# Patient Record
Sex: FEMALE | Employment: STUDENT | URBAN - METROPOLITAN AREA
[De-identification: names, ages, dates, MRNs, and addresses within clinical notes are randomized per-mention and may not be internally consistent; named-entity substitution may affect disease eponyms.]

---

## 2019-02-06 ENCOUNTER — MEDICAL CORRESPONDENCE (OUTPATIENT)
Dept: HEALTH INFORMATION MANAGEMENT | Facility: CLINIC | Age: 39
End: 2019-02-06

## 2019-02-06 ENCOUNTER — TELEPHONE (OUTPATIENT)
Dept: SURGERY | Facility: CLINIC | Age: 39
End: 2019-02-06

## 2019-02-06 ENCOUNTER — TRANSFERRED RECORDS (OUTPATIENT)
Dept: HEALTH INFORMATION MANAGEMENT | Facility: CLINIC | Age: 39
End: 2019-02-06

## 2019-02-06 NOTE — TELEPHONE ENCOUNTER
[facility] Call [facility]    Phone Message    May a detailed message be left on voicemail: yes    Reason for Call: Other: Sharon Rossing referring - anal bleeding. No records have come via fax. Pt scheduled next available.     Action Taken: Message routed to:  [facility] Rect[facility]

## 2019-02-11 NOTE — TELEPHONE ENCOUNTER
Patient was called to assess her symptoms in regard to the below message. The direct phone number was left in a voicemail with a request for the patient to call back at her earliest convenience.     ANGELA Suggs Care Coordinator  [facility]  Phone: [phone]

## 2019-02-21 NOTE — TELEPHONE ENCOUNTER
FUTURE VISIT INFORMATION      FUTURE VISIT INFORMATION:    Date: 2/25/19    Time: 10:30AM    Location: [facility]  REFERRAL INFORMATION:    Referring provider:  Sharon Cherry CNP    Referring providers clinic:  [facility]    Reason for visit/diagnosis:  Anal bleeding    NOTES STATUS DETAILS   OFFICE NOTE from referring provider  Received 2/6/19   OFFICE NOTE from other specialist   N/A    DISCHARGE SUMMARY from hospital  N/A    DISCHARGE REPORT from the ER N/A    OPERATIVE REPORT  N/A    MEDICATION LIST Received    LABS     PFC TESTING N/A    ANAL PAP N/A    BIOPSIES/PATHOLOGY RELATED TO DIAGNOSIS N/A    DIAGNOSTIC PROCEDURES     COLONOSCOPY N/A    UPPER ENDOSCOPY (EGD) N/A    FLEX SIGMOIDOSCOPY  N/A    ERCP N/A    IMAGING (DISC & REPORT)      CT  N/A    MRI N/A    XRAY N/A    ULTRASOUND (ENDOANAL/ENDORECTAL) N/A

## 2019-02-24 ASSESSMENT — ENCOUNTER SYMPTOMS
CONSTIPATION: 1
MYALGIAS: 1
NECK PAIN: 1
BLOOD IN STOOL: 1
PALPITATIONS: 1
DECREASED LIBIDO: 1
BLOATING: 1

## 2019-02-25 ENCOUNTER — OFFICE VISIT (OUTPATIENT)
Dept: SURGERY | Facility: CLINIC | Age: 39
End: 2019-02-25
Payer: COMMERCIAL

## 2019-02-25 ENCOUNTER — PRE VISIT (OUTPATIENT)
Dept: SURGERY | Facility: CLINIC | Age: 39
End: 2019-02-25

## 2019-02-25 VITALS
BODY MASS INDEX: 22.45 KG/M2 | TEMPERATURE: 97.7 F | DIASTOLIC BLOOD PRESSURE: 57 MMHG | OXYGEN SATURATION: 92 % | SYSTOLIC BLOOD PRESSURE: 97 MMHG | HEIGHT: 62 IN | RESPIRATION RATE: 16 BRPM | HEART RATE: 65 BPM | WEIGHT: 122 LBS

## 2019-02-25 DIAGNOSIS — K62.5 RECTAL BLEEDING: Primary | ICD-10-CM

## 2019-02-25 RX ORDER — CHOLECALCIFEROL (VITAMIN D3) 1250 MCG
CAPSULE ORAL
COMMUNITY
End: 2020-02-25

## 2019-02-25 RX ORDER — MULTIVIT-MIN/IRON/FOLIC ACID/K 18-600-40
CAPSULE ORAL
COMMUNITY
End: 2020-02-25

## 2019-02-25 SDOH — HEALTH STABILITY: MENTAL HEALTH: HOW OFTEN DO YOU HAVE 6 OR MORE DRINKS ON ONE OCCASION?: MONTHLY

## 2019-02-25 SDOH — HEALTH STABILITY: MENTAL HEALTH: HOW OFTEN DO YOU HAVE A DRINK CONTAINING ALCOHOL?: 2-4 TIMES A MONTH

## 2019-02-25 SDOH — HEALTH STABILITY: MENTAL HEALTH: HOW MANY STANDARD DRINKS CONTAINING ALCOHOL DO YOU HAVE ON A TYPICAL DAY?: 3 OR 4

## 2019-02-25 ASSESSMENT — MIFFLIN-ST. JEOR: SCORE: 1170.51

## 2019-02-25 ASSESSMENT — PAIN SCALES - GENERAL: PAINLEVEL: NO PAIN (0)

## 2019-02-25 NOTE — PROGRESS NOTES
"Colon and Rectal Surgery Consult Clinic Note    Date: 2019     Referring provider:  Sharon Cherry NP  [address] [address]     RE: Noemi Medrano  : 1980  WILLIAM: 2019    Noemi \"Nhi\" Mitchell is a very pleasant 38 year old female without a significant past medical history with a recent diagnosis of rectal bleeding.  Given these findings they were subsequently sent to the [facility] for an opinion on this and a new patient consultation.     Nhi had had bright red blood with bowel movements for about one month. This was initially quite a bit that dripped into the toilet. It has decreased some but is with every bowel movement. No associated pain. No abdominal pain, nausea, vomiting, or unexplained weight loss. No change in bowel habits. Stools are typically soft and every 2-3 days. Occasionally the start of the stool is slightly hard. She is taking a daily fiber supplement.  She reportedly had a right hemicolectomy at the age of 16 or 17. She was told she had an appendicitis but during surgery her colon was noted to be thickened and inflamed. Pathology showed no evidence of cancer or IBD.  She reports having a colonoscopy last about 2 years ago in [facility], and this was negative.    Assessment/Plan: 38 year old female with rectal bleeding. Very small hemorrhoids on exam. These certainly could be the source of her bleeding with some irregular bowel movements but are not convincingly large. Would recommend a flexible sigmoidoscopy to rule out other sources of bleeding as she has had persistent bleeding for over a month. In the meantime increase fiber supplement to 2-3 times a day and can take a laxative in addition if having hard stools. Patient's questions were answered to her stated satisfaction and she is in agreement with this plan.    Medical history:  No past medical history on file.    Surgical history:  No past surgical " "history on file.    Problem list:  There are no active problems to display for this patient.      Medications:  Current Outpatient Medications   Medication Sig Dispense Refill     Ascorbic Acid (VITAMIN C) 500 MG CAPS        cholecalciferol (VITAMIN D3) 71553 units (1250 mcg) capsule          Allergies:  Allergies   Allergen Reactions     Sulfa Drugs Rash       Family history:  No family history on file.    Social history:  Social History     Tobacco Use     Smoking status: Current Every Day Smoker     Packs/day: 0.25     Types: Cigarettes     Start date: 1/1/1998     Smokeless tobacco: Never Used   Substance Use Topics     Alcohol use: Yes     Alcohol/week: 1.2 - 2.4 oz     Types: 1 - 3 Glasses of wine, 1 Shots of liquor per week     Frequency: 2-4 times a month     Drinks per session: 3 or 4     Binge frequency: Monthly    Marital status: .  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      Marguerite Garcia LPN                             Physical Examination:  BP 97/57 (BP Location: Left arm, Patient Position: Sitting, Cuff Size: Adult Regular)   Pulse 65   Temp 97.7  F (36.5  C) (Oral)   Resp 16   Ht 5' 1.61\"   Wt 122 lb   SpO2 92%   BMI 22.59 kg/m    General: alert, oriented, in no acute distress, sitting comfortably  HEENT: mucous membranes moist   Perianal external examination: Exam was chaperoned by Marguerite Garcia LPN  Perianal skin: Intact with no excoriation or lichenification.  Lesions: No evidence of an external lesion, nodularity, or induration in the " perianal region.  Eversion of buttocks: There was not evidence of an anal fissure. Details: N/A.  Skin tags or external hemorrhoids: None.  Digital rectal examination: Was performed.   Sphincter tone: Good.  Palpable lesions: No.  Other: None.  Bimanual examination: was not performed    Anoscopy: Was performed.   Hemorrhoids: Yes. Grade 1 internal hemorrhoids without active bleeding  Lesions: No    Total face to face time was 20 minutes, >50% counseling.    SONAL Cota, NP-C  Colon and Rectal Surgery   [facility]    This note was created using speech recognition software and may contain unintended word substitutions.

## 2019-02-25 NOTE — NURSING NOTE
"Chief Complaint   Patient presents with     Consult     Possible anal bleeding       Vitals:    02/25/19 1030   BP: 97/57   BP Location: Left arm   Patient Position: Sitting   Cuff Size: Adult Regular   Pulse: 65   Resp: 16   Temp: 97.7  F (36.5  C)   TempSrc: Oral   SpO2: 92%   Weight: 122 lb   Height: 5' 1.61\"       Body mass index is 22.59 kg/m .      Marguerite Garcia LPN                          "

## 2019-02-25 NOTE — PATIENT INSTRUCTIONS
1. Start a daily fiber supplement such as Citrucel or Metamucil. Start with once a day and slowly increase up to three times a day, if needed, over the next 4-6 weeks  2. Return to clinic for flexible sigmoidoscopy   pHd

## 2019-02-25 NOTE — LETTER
"2019       RE: Noemi Medrano  [address]  [facility] [address]     Dear Colleague,    Thank you for referring your patient, Noemi Medrano, to the [facility] COLON AND RECTAL SURGERY at [facility]. Please see a copy of my visit note below.    Colon and Rectal Surgery Consult Clinic Note    Date: 2019     Referring provider:  Sharon Cherry NP  [address] [address]     RE: Noemi Medrano  : 1980  WILLIAM: 2019    Noemi \"Nhi\" Mitchell is a very pleasant 38 year old female without a significant past medical history with a recent diagnosis of rectal bleeding.  Given these findings they were subsequently sent to the [facility] Clinic for an opinion on this and a new patient consultation.     Nhi had had bright red blood with bowel movements for about one month. This was initially quite a bit that dripped into the toilet. It has decreased some but is with every bowel movement. No associated pain. No abdominal pain, nausea, vomiting, or unexplained weight loss. No change in bowel habits. Stools are typically soft and every 2-3 days. Occasionally the start of the stool is slightly hard. She is taking a daily fiber supplement.  She reportedly had a right hemicolectomy at the age of 16 or 17. She was told she had an appendicitis but during surgery her colon was noted to be thickened and inflamed. Pathology showed no evidence of cancer or IBD.  She reports having a colonoscopy last about 2 years ago in [facility], and this was negative.    Assessment/Plan: 38 year old female with rectal bleeding. Very small hemorrhoids on exam. These certainly could be the source of her bleeding with some irregular bowel movements but are not convincingly large. Would recommend a flexible sigmoidoscopy to rule out other sources of bleeding as she has had persistent bleeding for over a month. In the meantime increase " "fiber supplement to 2-3 times a day and can take a laxative in addition if having hard stools. Patient's questions were answered to her stated satisfaction and she is in agreement with this plan.    Medical history:  No past medical history on file.    Surgical history:  No past surgical history on file.    Problem list:  There are no active problems to display for this patient.    Medications:  Current Outpatient Medications   Medication Sig Dispense Refill     Ascorbic Acid (VITAMIN C) 500 MG CAPS        cholecalciferol (VITAMIN D3) 99053 units (1250 mcg) capsule          Allergies:  Allergies   Allergen Reactions     Sulfa Drugs Rash       Family history:  No family history on file.    Social history:  Social History     Tobacco Use     Smoking status: Current Every Day Smoker     Packs/day: 0.25     Types: Cigarettes     Start date: 1/1/1998     Smokeless tobacco: Never Used   Substance Use Topics     Alcohol use: Yes     Alcohol/week: 1.2 - 2.4 oz     Types: 1 - 3 Glasses of wine, 1 Shots of liquor per week     Frequency: 2-4 times a month     Drinks per session: 3 or 4     Binge frequency: Monthly    Marital status: .  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    Marguerite Garcia LPN     Physical Examination:  BP 97/57 (BP Location: Left arm, Patient Position: Sitting, Cuff Size: Adult Regular)   Pulse 65   Temp 97.7  F (36.5  C) (Oral)   Resp 16   Ht 5' 1.61\"   Wt 122 lb   SpO2 92%   BMI 22.59 kg/m     General: alert, oriented, in no acute distress, sitting " comfortably  HEENT: mucous membranes moist   Perianal external examination: Exam was chaperoned by Marguerite Garcia LPN  Perianal skin: Intact with no excoriation or lichenification.  Lesions: No evidence of an external lesion, nodularity, or induration in the perianal region.  Eversion of buttocks: There was not evidence of an anal fissure. Details: N/A.  Skin tags or external hemorrhoids: None.  Digital rectal examination: Was performed.   Sphincter tone: Good.  Palpable lesions: No.  Other: None.  Bimanual examination: was not performed    Anoscopy: Was performed.   Hemorrhoids: Yes. Grade 1 internal hemorrhoids without active bleeding  Lesions: No    Total face to face time was 20 minutes, >50% counseling.    SONAL Cota, NP-C  Colon and Rectal Surgery   [facility]    This note was created using speech recognition software and may contain unintended word substitutions.

## 2019-03-09 ENCOUNTER — HEALTH MAINTENANCE LETTER (OUTPATIENT)
Age: 39
End: 2019-03-09

## 2019-04-09 NOTE — PROGRESS NOTES
Colon and Rectal Surgery Consult Clinic Note    Referring provider:  SONAL Chau CNP  [address]  [place] [address]       RE: Noemi Medrano  : 1980  WILLIAM: 4/10/2019      Noemi Medrano is a very pleasant 38 year old female who saw Anabelle Berumen NP in clinic for rectal bleeding and a flexible sigmoidoscopy was recommended.    History of Present Ilness:     Taylor had had bright red blood with bowel movements for about one month. This was initially quite a bit that dripped into the toilet. It has decreased some but is with every bowel movement. No associated pain. No abdominal pain, nausea, vomiting, or unexplained weight loss. No change in bowel habits. Stools are typically soft and every 2-3 days. Occasionally the start of the stool is slightly hard. She is taking a daily fiber supplement.    She reportedly had a right hemicolectomy at the age of 16 or 17. She was told she had an appendicitis but during surgery her colon was noted to be thickened and inflamed. Pathology showed no evidence of cancer or IBD.    She reports having a colonoscopy last about 2 years ago in [facility], and this was negative.    Exam by Anabelle Berumen NP with some small internal hemorrhoids but no obvious source of rectal bleeding and increased fiber and flexible sigmoidoscopy were recommended.    Interval History:  Taylor has been doing well.  She has not had any bleeding for the past 15 days.  She was taking a daily fiber supplement but was having increasing abdominal discomfort with this.  She has had long-standing left lower quadrant abdominal pain that occurs intermittently.  This seems to be worse with a fiber supplement but has not completely subsided after stopping it.  Bowel movements are fairly soft but she does occasionally have a hard stool.  She denies any anal pain or tissue prolapse.    PLEASE SEE NOTE BELOW FOR PHYSICAL EXAMINATION, REVIEW OF SYSTEMS,  AND OTHER HISTORY.    Assessment/Plan: 38 year old female with rectal bleeding and intermittent left lower quadrant abdominal pain. No abnormalities on flexible sigmoidoscopy today but this was incomplete due to discomfort. No bleeding in the past few weeks. Recommended a daily fiber supplement to keep stools regular. Return to clinic for any return of rectal bleeding. Patient's questions were answered to her stated satisfaction and she is in agreement with this plan.    PLEASE SEE NOTE BELOW FOR PHYSICAL EXAMINATION, REVIEW OF SYSTEMS, AND OTHER HISTORY.    Total face to face time was 10 minutes, outside the procedure time, which was an additional 10 minutes, >50% counseling.    Sharlene Garcia MD  Colon and Rectal Surgery Staff  [facility]      -------------------------------------------------------------------------------------------------------------------      Medical history:  No past medical history on file.    Surgical history:  No past surgical history on file.    Problem list:  There are no active problems to display for this patient.      Medications:  Current Outpatient Medications   Medication Sig Dispense Refill     Ascorbic Acid (VITAMIN C) 500 MG CAPS        cholecalciferol (VITAMIN D3) 92496 units (1250 mcg) capsule        omega 3 1000 MG CAPS          Allergies:  Allergies   Allergen Reactions     Sulfa Drugs Rash       Family history:  No family history on file.    Social history:  Social History     Socioeconomic History     Marital status:      Spouse name: Not on file     Number of children: Not on file     Years of education: Not on file     Highest education level: Not on file   Occupational History     Not on file   Social Needs     Financial resource strain: Not on file     Food insecurity:     Worry: Not on file     Inability: Not on file     Transportation needs:     Medical: Not on file     Non-medical: Not on file   Tobacco Use     Smoking  "status: Current Every Day Smoker     Packs/day: 0.25     Types: Cigarettes     Start date: 1/1/1998     Smokeless tobacco: Never Used   Substance and Sexual Activity     Alcohol use: Yes     Alcohol/week: 1.2 - 2.4 oz     Types: 1 - 3 Glasses of wine, 1 Shots of liquor per week     Frequency: 2-4 times a month     Drinks per session: 3 or 4     Binge frequency: Monthly     Drug use: No     Sexual activity: Not on file   Lifestyle     Physical activity:     Days per week: Not on file     Minutes per session: Not on file     Stress: Not on file   Relationships     Social connections:     Talks on phone: Not on file     Gets together: Not on file     Attends [employer] service: Not on file     Active member of club or organization: Not on file     Attends meetings of clubs or organizations: Not on file     Relationship status: Not on file     Intimate partner violence:     Fear of current or ex partner: Not on file     Emotionally abused: Not on file     Physically abused: Not on file     Forced sexual activity: Not on file   Other Topics Concern     Not on file   Social History Narrative     Not on file         Nursing Notes:   Tylor Funez EMT  4/10/2019  1:01 PM  Signed  Chief Complaint   Patient presents with     Flexible Sigmoidoscopy     Rectal Problem       Vitals:    04/10/19 1253   BP: 103/64   BP Location: Left arm   Patient Position: Sitting   Cuff Size: Adult Regular   Pulse: 78   Weight: 118 lb   Height: 5' 1.61\"       Body mass index is 21.86 kg/m .      NEISHA Millan                         Physical Examination:  /64 (BP Location: Left arm, Patient Position: Sitting, Cuff Size: Adult Regular)   Pulse 78   Ht 1.565 m (5' 1.61\")   Wt 53.5 kg (118 lb)   BMI 21.86 kg/m    General: alert, oriented, in no acute distress, sitting comfortably  HEENT: mucous membranes moist    Digital rectal examination: Was performed.   Sphincter tone: Good.  Palpable lesions: No.  Other: None.  Bimanual " examination: was not performed.    Procedures:  Flexible sigmoidoscopy was performed by Anabelle Berumen NP under my direct supervision.  Two fleets enemas were given. Informed consent was obtained and time out was performed per protocol.  Scope was advanced to approximately 30 cm.  Patient had quite a bit of discomfort with this so was not advanced further.  No mucosal abnormalities.  Retroflexion was not performed due to patient discomfort.

## 2019-04-10 ENCOUNTER — OFFICE VISIT (OUTPATIENT)
Dept: INTERPRETER SERVICES | Facility: CLINIC | Age: 39
End: 2019-04-10
Payer: COMMERCIAL

## 2019-04-10 ENCOUNTER — OFFICE VISIT (OUTPATIENT)
Dept: SURGERY | Facility: CLINIC | Age: 39
End: 2019-04-10
Payer: COMMERCIAL

## 2019-04-10 VITALS
WEIGHT: 118 LBS | HEART RATE: 78 BPM | HEIGHT: 62 IN | BODY MASS INDEX: 21.71 KG/M2 | SYSTOLIC BLOOD PRESSURE: 103 MMHG | DIASTOLIC BLOOD PRESSURE: 64 MMHG

## 2019-04-10 DIAGNOSIS — K62.5 RECTAL BLEEDING: Primary | ICD-10-CM

## 2019-04-10 RX ORDER — OMEGA-3 FATTY ACIDS/FISH OIL 300-1000MG
CAPSULE ORAL
COMMUNITY
End: 2020-02-25

## 2019-04-10 ASSESSMENT — MIFFLIN-ST. JEOR: SCORE: 1152.3

## 2019-04-10 NOTE — LETTER
4/10/2019       RE: Noemi Medrano  [address]  [facility] [address]     Dear Colleague,    Thank you for referring your patient, Noemi Medrano, to the [facility] COLON AND RECTAL SURGERY at [facility]. Please see a copy of my visit note below.    Colon and Rectal Surgery Consult Clinic Note    Referring provider:  SONAL Chau CNP  [address] [place] [address]       RE: Noemi Medrano  : 1980  WILLIAM: 4/10/2019    Noemi Medrano is a very pleasant 38 year old female who saw Anabelle Berumen NP in clinic for rectal bleeding and a flexible sigmoidoscopy was recommended.    History of Present Ilness:     Taylor had had bright red blood with bowel movements for about one month. This was initially quite a bit that dripped into the toilet. It has decreased some but is with every bowel movement. No associated pain. No abdominal pain, nausea, vomiting, or unexplained weight loss. No change in bowel habits. Stools are typically soft and every 2-3 days. Occasionally the start of the stool is slightly hard. She is taking a daily fiber supplement.    She reportedly had a right hemicolectomy at the age of 16 or 17. She was told she had an appendicitis but during surgery her colon was noted to be thickened and inflamed. Pathology showed no evidence of cancer or IBD.    She reports having a colonoscopy last about 2 years ago in [facility], and this was negative.    Exam by Anabelle Berumen NP with some small internal hemorrhoids but no obvious source of rectal bleeding and increased fiber and flexible sigmoidoscopy were recommended.    Interval History:  Taylor has been doing well.  She has not had any bleeding for the past 15 days.  She was taking a daily fiber supplement but was having increasing abdominal discomfort with this.  She has had long-standing left lower quadrant abdominal pain that occurs intermittently.   This seems to be worse with a fiber supplement but has not completely subsided after stopping it.  Bowel movements are fairly soft but she does occasionally have a hard stool.  She denies any anal pain or tissue prolapse.    PLEASE SEE NOTE BELOW FOR PHYSICAL EXAMINATION, REVIEW OF SYSTEMS, AND OTHER HISTORY.    Assessment/Plan: 38 year old female with rectal bleeding and intermittent left lower quadrant abdominal pain. No abnormalities on flexible sigmoidoscopy today but this was incomplete due to discomfort. No bleeding in the past few weeks. Recommended a daily fiber supplement to keep stools regular. Return to clinic for any return of rectal bleeding. Patient's questions were answered to her stated satisfaction and she is in agreement with this plan.    PLEASE SEE NOTE BELOW FOR PHYSICAL EXAMINATION, REVIEW OF SYSTEMS, AND OTHER HISTORY.    Total face to face time was 10 minutes, outside the procedure time, which was an additional 10 minutes, >50% counseling.    Sharlene Garcia MD  Colon and Rectal Surgery Staff  [facility]    -------------------------------------------------------------------------------------------------------------------    Medical history:  No past medical history on file.    Surgical history:  No past surgical history on file.    Problem list:  There are no active problems to display for this patient.    Medications:  Current Outpatient Medications   Medication Sig Dispense Refill     Ascorbic Acid (VITAMIN C) 500 MG CAPS        cholecalciferol (VITAMIN D3) 83015 units (1250 mcg) capsule        omega 3 1000 MG CAPS          Allergies:  Allergies   Allergen Reactions     Sulfa Drugs Rash       Family history:  No family history on file.    Social history:  Social History     Socioeconomic History     Marital status:      Spouse name: Not on file     Number of children: Not on file     Years of education: Not on file     Highest education level: Not  "on file   Occupational History     Not on file   Social Needs     Financial resource strain: Not on file     Food insecurity:     Worry: Not on file     Inability: Not on file     Transportation needs:     Medical: Not on file     Non-medical: Not on file   Tobacco Use     Smoking status: Current Every Day Smoker     Packs/day: 0.25     Types: Cigarettes     Start date: 1/1/1998     Smokeless tobacco: Never Used   Substance and Sexual Activity     Alcohol use: Yes     Alcohol/week: 1.2 - 2.4 oz     Types: 1 - 3 Glasses of wine, 1 Shots of liquor per week     Frequency: 2-4 times a month     Drinks per session: 3 or 4     Binge frequency: Monthly     Drug use: No     Sexual activity: Not on file   Lifestyle     Physical activity:     Days per week: Not on file     Minutes per session: Not on file     Stress: Not on file   Relationships     Social connections:     Talks on phone: Not on file     Gets together: Not on file     Attends Restoration service: Not on file     Active member of club or organization: Not on file     Attends meetings of clubs or organizations: Not on file     Relationship status: Not on file     Intimate partner violence:     Fear of current or ex partner: Not on file     Emotionally abused: Not on file     Physically abused: Not on file     Forced sexual activity: Not on file   Other Topics Concern     Not on file   Social History Narrative     Not on file       Nursing Notes:   Tylor Funez EMT  4/10/2019  1:01 PM  Signed  Chief Complaint   Patient presents with     Flexible Sigmoidoscopy     Rectal Problem       Vitals:    04/10/19 1253   BP: 103/64   BP Location: Left arm   Patient Position: Sitting   Cuff Size: Adult Regular   Pulse: 78   Weight: 118 lb   Height: 5' 1.61\"       Body mass index is 21.86 kg/m .      NEISHA Millan       Physical Examination:  /64 (BP Location: Left arm, Patient Position: Sitting, Cuff Size: Adult Regular)   Pulse 78   Ht 1.565 m (5' 1.61\") "   Wt 53.5 kg (118 lb)   BMI 21.86 kg/m     General: alert, oriented, in no acute distress, sitting comfortably  HEENT: mucous membranes moist    Digital rectal examination: Was performed.   Sphincter tone: Good.  Palpable lesions: No.  Other: None.  Bimanual examination: was not performed.    Procedures:  Flexible sigmoidoscopy was performed by Anabelle Berumen NP under my direct supervision.  Two fleets enemas were given. Informed consent was obtained and time out was performed per protocol.  Scope was advanced to approximately 30 cm.  Patient had quite a bit of discomfort with this so was not advanced further.  No mucosal abnormalities.  Retroflexion was not performed due to patient discomfort.    Again, thank you for allowing me to participate in the care of your patient.      Sincerely,    Sharlene Garcia MD

## 2019-04-10 NOTE — NURSING NOTE
"Chief Complaint   Patient presents with     Flexible Sigmoidoscopy     Rectal Problem       Vitals:    04/10/19 1253   BP: 103/64   BP Location: Left arm   Patient Position: Sitting   Cuff Size: Adult Regular   Pulse: 78   Weight: 118 lb   Height: 5' 1.61\"       Body mass index is 21.86 kg/m .      Tylor Funez, EMT                      "

## 2019-06-26 ENCOUNTER — MEDICAL CORRESPONDENCE (OUTPATIENT)
Dept: HEALTH INFORMATION MANAGEMENT | Facility: CLINIC | Age: 39
End: 2019-06-26

## 2019-06-26 ENCOUNTER — TRANSFERRED RECORDS (OUTPATIENT)
Dept: HEALTH INFORMATION MANAGEMENT | Facility: CLINIC | Age: 39
End: 2019-06-26

## 2019-07-22 NOTE — TELEPHONE ENCOUNTER
FUTURE VISIT INFORMATION      FUTURE VISIT INFORMATION:    Date: 7.24.19    Time: 11:00, 11:15    Location: [facility]  REFERRAL INFORMATION:    Referring provider:  Angi Ocampo    Referring providers clinic:  [facility]    Reason for visit/diagnosis:  DX:  Telangiectasia of Face, scheduled per pt    RECORDS REQUESTED FROM:       Clinic name Comments Records Status Imaging Status   Martha 6.26.19 Angi Ocampo In [facility]

## 2019-07-24 ENCOUNTER — PRE VISIT (OUTPATIENT)
Dept: DERMATOLOGY | Facility: CLINIC | Age: 39
End: 2019-07-24

## 2019-07-24 ENCOUNTER — OFFICE VISIT (OUTPATIENT)
Dept: DERMATOLOGY | Facility: CLINIC | Age: 39
End: 2019-07-24
Payer: COMMERCIAL

## 2019-07-24 DIAGNOSIS — L65.9 ALOPECIA: ICD-10-CM

## 2019-07-24 DIAGNOSIS — L81.1 MELASMA: ICD-10-CM

## 2019-07-24 DIAGNOSIS — L65.9 ALOPECIA: Primary | ICD-10-CM

## 2019-07-24 DIAGNOSIS — L30.9 DERMATITIS: ICD-10-CM

## 2019-07-24 LAB
BASOPHILS # BLD AUTO: 0 10E9/L (ref 0–0.2)
BASOPHILS NFR BLD AUTO: 0.7 %
DIFFERENTIAL METHOD BLD: NORMAL
EOSINOPHIL # BLD AUTO: 0.2 10E9/L (ref 0–0.7)
EOSINOPHIL NFR BLD AUTO: 3.4 %
ERYTHROCYTE [DISTWIDTH] IN BLOOD BY AUTOMATED COUNT: 12.2 % (ref 10–15)
FERRITIN SERPL-MCNC: 99 NG/ML (ref 12–150)
HCT VFR BLD AUTO: 43.6 % (ref 35–47)
HGB BLD-MCNC: 14.6 G/DL (ref 11.7–15.7)
IMM GRANULOCYTES # BLD: 0 10E9/L (ref 0–0.4)
IMM GRANULOCYTES NFR BLD: 0.2 %
LYMPHOCYTES # BLD AUTO: 2.1 10E9/L (ref 0.8–5.3)
LYMPHOCYTES NFR BLD AUTO: 34.8 %
MCH RBC QN AUTO: 31.9 PG (ref 26.5–33)
MCHC RBC AUTO-ENTMCNC: 33.5 G/DL (ref 31.5–36.5)
MCV RBC AUTO: 95 FL (ref 78–100)
MONOCYTES # BLD AUTO: 0.4 10E9/L (ref 0–1.3)
MONOCYTES NFR BLD AUTO: 6.8 %
NEUTROPHILS # BLD AUTO: 3.2 10E9/L (ref 1.6–8.3)
NEUTROPHILS NFR BLD AUTO: 54.1 %
NRBC # BLD AUTO: 0 10*3/UL
NRBC BLD AUTO-RTO: 0 /100
PLATELET # BLD AUTO: 240 10E9/L (ref 150–450)
RBC # BLD AUTO: 4.57 10E12/L (ref 3.8–5.2)
TSH SERPL DL<=0.005 MIU/L-ACNC: 2.59 MU/L (ref 0.4–4)
WBC # BLD AUTO: 5.9 10E9/L (ref 4–11)

## 2019-07-24 RX ORDER — TRETINOIN 0.5 MG/G
CREAM TOPICAL AT BEDTIME
COMMUNITY
End: 2021-03-29

## 2019-07-24 RX ORDER — HYDROCORTISONE 2.5 %
CREAM (GRAM) TOPICAL
Qty: 30 G | Refills: 1 | Status: SHIPPED | OUTPATIENT
Start: 2019-07-24 | End: 2020-02-25

## 2019-07-24 ASSESSMENT — PAIN SCALES - GENERAL: PAINLEVEL: NO PAIN (0)

## 2019-07-24 NOTE — PROGRESS NOTES
[facility] Dermatology Note      Dermatology Problem List:  1. Melasma   -current tx: Tri-Lyla   -previous tx:metrocream, tretinoin 0.05% cream    Encounter Date: Jul 24, 2019    CC:  Chief Complaint   Patient presents with     Derm Problem     Taylor is here today for redness, telangietasia and hyperpigmenation on her face. She also notes possible fungal infection of toenail, and a rash on the hip. Taylor notes she has had recent hair thinning as well.          History of Present Illness:  Ms. Noemi Medrano is a 38 year old female who presents as a referral from [facility] for redness and hyperpigmentation of the face. She has never been seen in our dermatology clinic before. Today she reports that she is using metrocream and tretinoin. Her facial problems started in the winter. The creams have been helping with the acne, but not with the redness. Wears sunscreen everyday. Also reports of patches of hair loss that have been present in the past year. In the last 2 months she has noticed that her hair has been coming out more diffusely. Thinning generally in the back of the scalp. Her pillow is usually full of hair and it comes out in clumps when she is in the shower. Reports of a possible toe fungus on the toenail and a rash on the hip. She noticed the rash about 2-3 days ago. She went camping 3 weeks ago, could be a possible bite.       Past Medical History:   There is no problem list on file for this patient.    No past medical history on file.  No past surgical history on file.    Social History:  Patient is originally from [place].     Family History:  Family history of skin cancer, unknown, father     Medications:  Current Outpatient Medications   Medication Sig Dispense Refill     metroNIDAZOLE (METROCREAM) 0.75 % external cream Apply topically 2 times daily       omega 3 1000 MG CAPS        tretinoin (RETIN-A) 0.05 % external cream Apply topically At Bedtime       Ascorbic Acid  (VITAMIN C) 500 MG CAPS        cholecalciferol (VITAMIN D3) 65834 units (1250 mcg) capsule          Allergies   Allergen Reactions     Sulfa Drugs Rash       Review of Systems:  -Skin: as per HPI.  -Constitutional: The patient is feeling generally well.  -OB: not pregnant or breast feeding.     Physical exam:  Vitals: There were no vitals taken for this visit.  GEN: This is a well developed, well-nourished female in no acute distress, in a pleasant mood.    SKIN: Acne exam, which includes the face, neck, upper central chest, and upper central back was performed. Including toe nail.   - pink 1 mm papules coalescing into a plaque on the left hip  - Hyperpigmented macules coalescing into macules on the foreheads and cheeks   - Telangiectasias on the cheeks   - Hair pull tests negative   - Joe part width is 1  - scalp appears healthy   - eyelashes and eyelashes WNL   - yellow discoloration of the right great toe nail   - No other lesions of concern on areas examined.       Impression/Plan:  1. Melasma, cheeks and forehead      Continue sunscreen daily     Recommend sunscreens containing zinc oxide or titanium dioxide or iron oxide     Hold metrocream and tretinoin     Discussed Tri-Lyla - apply once daily to the entire face for 8 weeks. Ok to skip a day if too irritating, use moisturizer in replacement     Discussed chemical peels in the fall, pt is too tan right now     Photos taken and sunscreen samples given     2. Rosacea, mild     Will hold off on treatment at this time    Recommend visiting an eye doctor     3. Alopecia, mild,non scarring-, pt would like to schedule full hair eval - negative hair pull test, healthy scalp scalp, no evidence of scarring, will initiate work up, could be resolved TE that will take time to recover or unmasking of pattern hair losss. Keep alopecia areata diffuse type in the differential of course    For alopecia, will obtain TSH with reflex T4, CBC with Diff, ferritin, and Vitamin D.      Follow up in 2-4 weeks and consider biopsy at that time    4. Dermatitis - left hip    Start Hydrocortisone 2.5% cream for up to 2 weeks in a row    5. Onychomycosis possibly on toenails    Grow out toe nail and come back for clipping         Follow-up in 2-4 weeks for hair evaluation and nail clipping       Staff Involved:  Scribe/Staff    Scribe Disclosure  I, Lore Rodriguez, am serving as a scribe to document services personally performed by Dr. Anayeli Perze MD, based on data collection and the provider's statements to me.     Provider Disclosure:   The documentation recorded by the scribe accurately reflects the services I personally performed and the decisions made by me.    Anayeli Perez MD    Department of Dermatology  [facility]: Phone: [phone], Fax:[phone]  [facility]: Phone: [phone], Fax: [phone]

## 2019-07-24 NOTE — NURSING NOTE
Chief Complaint   Patient presents with     Derm Problem     Taylor is here today for redness, telangietasia and hyperpigmenation on her face. She also notes possible fungal infection of toenail, and a rash on the hip. Taylor notes she has had recent hair thinning as well.      Lila Kelsey KYLEE    Dermatology Laser Intake Checklist:  History of psoriasis:No  History of recent tan, indoor or outdoor tanning/vacation or other sun exposure: Yes, last week she was camping wears sunblock  History of vitiligo:No  Family history of vitiligo:No  Recent other cosmetic procedure(microderm abrasion/peel/hair removal/facial etc):No  History of HSV:No   Did the patient start valtrex: No  For genital laser hair removal patient only: Is there a history of genital warts or condyloma:NA  Tattoo in the area to be treated:No  Is patient using hydroquinone:No  Retinoids and other acne medications stopped for 2 weeks:No  Has the patient had accutane in the last 6-12 months:No  Pregnant or breastfeeding: No  History of skin cancer in area planned for treatment: No  History of treatment with gold:No  Changes in medical history: No  Photos obtained: Yes  Does the patient smoke:Yes  Is the patient on ibuprofen/aspirin/plavix/coumadin/other blood thinner: No  If patient is taking narcotic or diazepam(valium)-does patient have : No  There were no vitals taken for this visit.

## 2019-07-24 NOTE — LETTER
7/24/2019       RE: Noemi Medrano  [address]  [facility] [address]     Dear Colleague,    Thank you for referring your patient, Noemi Medrano, to the [facility] DERMATOLOGY at [facility]. Please see a copy of my visit note below.    [facility] Dermatology Note      Dermatology Problem List:  1. Melasma   -current tx: Tri-Lyla   -previous tx:metrocream, tretinoin 0.05% cream    Encounter Date: Jul 24, 2019    CC:  Chief Complaint   Patient presents with     Derm Problem     Taylor is here today for redness, telangietasia and hyperpigmenation on her face. She also notes possible fungal infection of toenail, and a rash on the hip. Taylor notes she has had recent hair thinning as well.          History of Present Illness:  Ms. Noemi Medrano is a 38 year old female who presents as a referral from [facility] for redness and hyperpigmentation of the face. She has never been seen in our dermatology clinic before. Today she reports that she is using metrocream and tretinoin. Her facial problems started in the winter. The creams have been helping with the acne, but not with the redness. Wears sunscreen everyday. Also reports of patches of hair loss that have been present in the past year. In the last 2 months she has noticed that her hair has been coming out more diffusely. Thinning generally in the back of the scalp. Her pillow is usually full of hair and it comes out in clumps when she is in the shower. Reports of a possible toe fungus on the toenail and a rash on the hip. She noticed the rash about 2-3 days ago. She went camping 3 weeks ago, could be a possible bite.       Past Medical History:   There is no problem list on file for this patient.    No past medical history on file.  No past surgical history on file.    Social History:  Patient is originally from [place].     Family History:  Family history of skin cancer, unknown, father      Medications:  Current Outpatient Medications   Medication Sig Dispense Refill     metroNIDAZOLE (METROCREAM) 0.75 % external cream Apply topically 2 times daily       omega 3 1000 MG CAPS        tretinoin (RETIN-A) 0.05 % external cream Apply topically At Bedtime       Ascorbic Acid (VITAMIN C) 500 MG CAPS        cholecalciferol (VITAMIN D3) 58912 units (1250 mcg) capsule          Allergies   Allergen Reactions     Sulfa Drugs Rash       Review of Systems:  -Skin: as per HPI.  -Constitutional: The patient is feeling generally well.  -OB: not pregnant or breast feeding.     Physical exam:  Vitals: There were no vitals taken for this visit.  GEN: This is a well developed, well-nourished female in no acute distress, in a pleasant mood.    SKIN: Acne exam, which includes the face, neck, upper central chest, and upper central back was performed. Including toe nail.   - pink 1 mm papules coalescing into a plaque on the left hip  - Hyperpigmented macules coalescing into macules on the foreheads and cheeks   - Telangiectasias on the cheeks   - Hair pull tests negative   - Joe part width is 1  - scalp appears healthy   - eyelashes and eyelashes WNL   - yellow discoloration of the right great toe nail   - No other lesions of concern on areas examined.       Impression/Plan:  1. Melasma, cheeks and forehead      Continue sunscreen daily     Recommend sunscreens containing zinc oxide or titanium dioxide or iron oxide     Hold metrocream and tretinoin     Discussed Tri-Lyla - apply once daily to the entire face for 8 weeks. Ok to skip a day if too irritating, use moisturizer in replacement     Discussed chemical peels in the fall, pt is too tan right now     Photos taken and sunscreen samples given     2. Rosacea, mild     Will hold off on treatment at this time    Recommend visiting an eye doctor     3. Alopecia, mild,non scarring-, pt would like to schedule full hair eval - negative hair pull test, healthy scalp scalp,  no evidence of scarring, will initiate work up, could be resolved TE that will take time to recover or unmasking of pattern hair losss. Keep alopecia areata diffuse type in the differential of course    For alopecia, will obtain TSH with reflex T4, CBC with Diff, ferritin, and Vitamin D.     Follow up in 2-4 weeks and consider biopsy at that time    4. Dermatitis - left hip    Start Hydrocortisone 2.5% cream for up to 2 weeks in a row    5. Onychomycosis possibly on toenails    Grow out toe nail and come back for clipping     Follow-up in 2-4 weeks for hair evaluation and nail clipping       Staff Involved:  Scribe/Staff    Scribe Disclosure  I, Lore Rodriguez, am serving as a scribe to document services personally performed by Dr. Anayeli Perez MD, based on data collection and the provider's statements to me.     Provider Disclosure:   The documentation recorded by the scribe accurately reflects the services I personally performed and the decisions made by me.    Anayeli Perez MD    Department of Dermatology  [facility] [place]: Phone: [phone], Fax:[phone]  [facility]: Phone: [phone], Fax: [phone]

## 2019-07-25 LAB — DEPRECATED CALCIDIOL+CALCIFEROL SERPL-MC: 27 UG/L (ref 20–75)

## 2019-07-31 ENCOUNTER — TELEPHONE (OUTPATIENT)
Dept: DERMATOLOGY | Facility: CLINIC | Age: 39
End: 2019-07-31

## 2019-07-31 NOTE — TELEPHONE ENCOUNTER
Notes recorded by Darlene Mcbride LPN on 7/31/2019 at 8:27 AM CDT  Spoke to pt regarding vitamin D results. Pt informed of recommendations, she will follow up with PCP and has no further questions at this time.  Darlene Mcbride LPN    ------    Notes recorded by Anayeli Perez MD on 7/30/2019 at 5:41 PM CDT  Vitamin D is good, should review with PCP as she is on 5000 daily to see how long she should stay on that dose

## 2019-07-31 NOTE — TELEPHONE ENCOUNTER
----- Message from Anayeli Perez MD sent at 7/30/2019  5:41 PM CDT -----  Vitamin D is good, should review with PCP as she is on 5000 daily to see how long she should stay on that dose

## 2019-08-16 DIAGNOSIS — L81.1 MELASMA: ICD-10-CM

## 2020-02-08 ENCOUNTER — NURSE TRIAGE (OUTPATIENT)
Dept: NURSING | Facility: CLINIC | Age: 40
End: 2020-02-08

## 2020-02-08 ENCOUNTER — APPOINTMENT (OUTPATIENT)
Dept: ULTRASOUND IMAGING | Facility: CLINIC | Age: 40
End: 2020-02-08
Attending: FAMILY MEDICINE
Payer: COMMERCIAL

## 2020-02-08 ENCOUNTER — HOSPITAL ENCOUNTER (EMERGENCY)
Facility: CLINIC | Age: 40
Discharge: HOME | End: 2020-02-08
Attending: FAMILY MEDICINE | Admitting: FAMILY MEDICINE
Payer: COMMERCIAL

## 2020-02-08 VITALS
SYSTOLIC BLOOD PRESSURE: 98 MMHG | OXYGEN SATURATION: 100 % | TEMPERATURE: 97.9 F | RESPIRATION RATE: 18 BRPM | DIASTOLIC BLOOD PRESSURE: 58 MMHG | HEART RATE: 71 BPM

## 2020-02-08 DIAGNOSIS — O20.9 FIRST TRIMESTER BLEEDING: ICD-10-CM

## 2020-02-08 LAB
B-HCG SERPL-ACNC: 676 IU/L (ref 0–5)
BASOPHILS # BLD AUTO: 0 10E9/L (ref 0–0.2)
BASOPHILS NFR BLD AUTO: 0.3 %
DIFFERENTIAL METHOD BLD: NORMAL
EOSINOPHIL # BLD AUTO: 0.2 10E9/L (ref 0–0.7)
EOSINOPHIL NFR BLD AUTO: 1.8 %
ERYTHROCYTE [DISTWIDTH] IN BLOOD BY AUTOMATED COUNT: 12.3 % (ref 10–15)
HCT VFR BLD AUTO: 44.8 % (ref 35–47)
HGB BLD-MCNC: 15.6 G/DL (ref 11.7–15.7)
IMM GRANULOCYTES # BLD: 0 10E9/L (ref 0–0.4)
IMM GRANULOCYTES NFR BLD: 0.2 %
LYMPHOCYTES # BLD AUTO: 2.7 10E9/L (ref 0.8–5.3)
LYMPHOCYTES NFR BLD AUTO: 28.8 %
MCH RBC QN AUTO: 32.1 PG (ref 26.5–33)
MCHC RBC AUTO-ENTMCNC: 34.8 G/DL (ref 31.5–36.5)
MCV RBC AUTO: 92 FL (ref 78–100)
MONOCYTES # BLD AUTO: 0.5 10E9/L (ref 0–1.3)
MONOCYTES NFR BLD AUTO: 5.7 %
NEUTROPHILS # BLD AUTO: 6 10E9/L (ref 1.6–8.3)
NEUTROPHILS NFR BLD AUTO: 63.2 %
NRBC # BLD AUTO: 0 10*3/UL
NRBC BLD AUTO-RTO: 0 /100
PLATELET # BLD AUTO: 269 10E9/L (ref 150–450)
RBC # BLD AUTO: 4.86 10E12/L (ref 3.8–5.2)
WBC # BLD AUTO: 9.4 10E9/L (ref 4–11)

## 2020-02-08 PROCEDURE — 99284 EMERGENCY DEPT VISIT MOD MDM: CPT | Performed by: FAMILY MEDICINE

## 2020-02-08 PROCEDURE — 25000132 ZZH RX MED GY IP 250 OP 250 PS 637: Performed by: FAMILY MEDICINE

## 2020-02-08 PROCEDURE — 99283 EMERGENCY DEPT VISIT LOW MDM: CPT | Mod: Z6 | Performed by: FAMILY MEDICINE

## 2020-02-08 PROCEDURE — 76817 TRANSVAGINAL US OBSTETRIC: CPT

## 2020-02-08 PROCEDURE — 84702 CHORIONIC GONADOTROPIN TEST: CPT | Performed by: FAMILY MEDICINE

## 2020-02-08 PROCEDURE — 85025 COMPLETE CBC W/AUTO DIFF WBC: CPT | Performed by: FAMILY MEDICINE

## 2020-02-08 RX ORDER — ACETAMINOPHEN 500 MG
1000 TABLET ORAL ONCE
Status: COMPLETED | OUTPATIENT
Start: 2020-02-08 | End: 2020-02-08

## 2020-02-08 RX ADMIN — ACETAMINOPHEN 1000 MG: 500 TABLET ORAL at 11:44

## 2020-02-08 ASSESSMENT — ENCOUNTER SYMPTOMS
HEADACHES: 1
ABDOMINAL PAIN: 1

## 2020-02-08 NOTE — ED AVS SNAPSHOT
[facility], [place], Emergency Department  [address]  [place] [address]  Phone:  [phone]  Fax:  [phone]                                    Noemi Medrano   MRN: [id number]    Department:  [facility], Emergency Department   Date of Visit:  2/8/2020           After Visit Summary Signature Page    I have received my discharge instructions, and my questions have been answered. I have discussed any challenges I see with this plan with the nurse or doctor.    ..........................................................................................................................................  Patient/Patient Representative Signature      ..........................................................................................................................................  Patient Representative Print Name and Relationship to Patient    ..................................................               ................................................  Date                                   Time    ..........................................................................................................................................  Reviewed by Signature/Title    ...................................................              ..............................................  Date                                               Time          22EPIC Rev 08/18

## 2020-02-08 NOTE — DISCHARGE INSTRUCTIONS
Follow-up with [facility] [phone] for repeat hCG level return if marked increase in bleeding or pain.

## 2020-02-08 NOTE — TELEPHONE ENCOUNTER
Maurice is calling and states that wife is currently 6 weeks pregnant and started vaginally bleeding.  Due date is 10/01/2020.  Bleeding started two days ago started with brown and now is more red.  Now bleeding is more heavy and using a pad.  One pad per hour and is noticing clots.      Reason for Disposition    [1] MODERATE vaginal bleeding (i.e., soaking 1 pad / hour; clots) AND [2] present > 6 hours    Additional Information    Negative: Shock suspected (e.g., cold/pale/clammy skin, too weak to stand, low BP, rapid pulse)    Negative: Difficult to awaken or acting confused (e.g., disoriented, slurred speech)    Negative: Passed out (i.e., lost consciousness, collapsed and was not responding)    Negative: Sounds like a life-threatening emergency to the triager    Negative: SEVERE abdominal pain    Negative: [1] SEVERE vaginal bleeding (i.e., soaking 2 pads / hour, large blood clots) AND [2] present 2 or more hours    Negative: SEVERE dizziness (e.g., unable to stand, requires support to walk, feels like passing out)    Protocols used: PREGNANCY - VAGINAL BLEEDING LESS THAN 20 WEEKS [facility]-

## 2020-02-08 NOTE — ED PROVIDER NOTES
[facility] EMERGENCY DEPARTMENT ([facility])    20        History     Chief Complaint   Patient presents with     Vaginal Bleeding     Headache     The history is provided by the patient and medical records.     Noemi Medrano is a 39 year old 6wk  female who presents to the Emergency Department with vaginal bleeding and a headache. Patient reports she began spotting on Wednesday, 2020. Patient states she also got a migraine with this spotting. She states she took medication for the migraine, but it only helped for a couple of hours. Patient reports her bleeding increased slightly last night, and increased more this morning and is now reddish. Patient reports abdominal cramping.     I have reviewed the Medications, Allergies, Past Medical and Surgical History, and Social History in the Epic system.    History reviewed. No pertinent past medical history.    Past Surgical History:   Procedure Laterality Date     APPENDECTOMY       COLON SURGERY         History reviewed. No pertinent family history.    Social History     Tobacco Use     Smoking status: Current Every Day Smoker     Packs/day: 0.25     Types: Cigarettes     Start date: 1998     Smokeless tobacco: Never Used   Substance Use Topics     Alcohol use: Not Currently     Alcohol/week: 2.0 - 4.0 standard drinks     Types: 1 - 3 Glasses of wine, 1 Shots of liquor per week     Frequency: 2-4 times a month     Drinks per session: 3 or 4     Binge frequency: Monthly       No current facility-administered medications for this encounter.      Current Outpatient Medications   Medication     Ascorbic Acid (VITAMIN C) 500 MG CAPS     cholecalciferol (VITAMIN D3) 42538 units (1250 mcg) capsule     fluocin-hydroquinone-tretinoin 0.01-4-0.05 % CREA     hydrocortisone 2.5 % cream     metroNIDAZOLE (METROCREAM) 0.75 % external cream     omega 3 1000 MG CAPS     tretinoin (RETIN-A) 0.05 % external cream        Allergies   Allergen Reactions      Sulfa Drugs Rash       Review of Systems   Gastrointestinal: Positive for abdominal pain (cramping).   Genitourinary: Positive for vaginal bleeding.   Neurological: Positive for headaches.   All other systems reviewed and are negative.      Physical Exam   BP: 115/53  Pulse: 71  Temp: 97  F (36.1  C)  Resp: 18  SpO2: 99 %      Physical Exam  Constitutional:       General: She is not in acute distress.     Appearance: She is not diaphoretic.   HENT:      Head: Atraumatic.      Mouth/Throat:      Pharynx: No oropharyngeal exudate.   Eyes:      General: No scleral icterus.     Pupils: Pupils are equal, round, and reactive to light.   Cardiovascular:      Heart sounds: Normal heart sounds.   Pulmonary:      Effort: No respiratory distress.      Breath sounds: Normal breath sounds.   Abdominal:      General: Bowel sounds are normal.      Palpations: Abdomen is soft.      Tenderness: There is no abdominal tenderness.   Genitourinary:     Vagina: Bleeding present.      Cervix: No cervical motion tenderness or discharge.      Uterus: Not tender.       Adnexa: Right adnexa normal and left adnexa normal.   Musculoskeletal:         General: No tenderness.   Skin:     General: Skin is warm.      Findings: No rash.         ED Course   11:17 AM  The patient was seen and examined by Haider Rosas MD in Room ED05.        Procedures       Results for orders placed or performed during the hospital encounter of 02/08/20 (from the past 48 hour(s))   CBC with platelets differential   Result Value Ref Range    WBC 9.4 4.0 - 11.0 10e9/L    RBC Count 4.86 3.8 - 5.2 10e12/L    Hemoglobin 15.6 11.7 - 15.7 g/dL    Hematocrit 44.8 35.0 - 47.0 %    MCV 92 78 - 100 fl    MCH 32.1 26.5 - 33.0 pg    MCHC 34.8 31.5 - 36.5 g/dL    RDW 12.3 10.0 - 15.0 %    Platelet Count 269 150 - 450 10e9/L    Diff Method Automated Method     % Neutrophils 63.2 %    % Lymphocytes 28.8 %    % Monocytes 5.7 %    % Eosinophils 1.8 %    % Basophils 0.3 %    %  Immature Granulocytes 0.2 %    Nucleated RBCs 0 0 /100    Absolute Neutrophil 6.0 1.6 - 8.3 10e9/L    Absolute Lymphocytes 2.7 0.8 - 5.3 10e9/L    Absolute Monocytes 0.5 0.0 - 1.3 10e9/L    Absolute Eosinophils 0.2 0.0 - 0.7 10e9/L    Absolute Basophils 0.0 0.0 - 0.2 10e9/L    Abs Immature Granulocytes 0.0 0 - 0.4 10e9/L    Absolute Nucleated RBC 0.0    HCG quantitative pregnancy (blood)   Result Value Ref Range    HCG Quantitative Serum 676 (H) 0 - 5 IU/L   US OB <14 Weeks W Transvaginal    Narrative    ULTRASOUND OBSTETRIC <14 WEEKS WITH TRANSVAGINAL SINGLE February 8, 2020 12:55 PM    HISTORY: Six weeks gestation with spotting.    TECHNIQUE: Transabdominal and transvaginal imaging were performed.  Transvaginal imaging was performed to better evaluate the uterus and  gestational sac.    COMPARISON: None.    FINDINGS: Intrauterine fluid collection could represent a gestational  sac of approximately 5 weeks 0 days gestational age as determined by  mean sac diameter of 0.5 cm. A definite fetal pole or yolk sac is not  yet seen. A structure in the gestational sac could represent a very  small early yolk sac. No fetal heartbeat is identified.    The ovaries are grossly normal in appearance measuring 2.7 x 1.8 x 1.9  cm on the right and 2.3 x 1.7 x 1.1 cm on the left. Probable corpus  luteum cyst is seen in the right ovary.    Probable fibroid is noted in the upper uterus adjacent to the  endometrial stripe measuring 0.9 cm in diameter.    No abnormal free fluid collections are seen in the cul-de-sac.      Impression    IMPRESSION: Intrauterine fluid collection could represent a  gestational sac of approximately 5 weeks 0 days gestational age. No  definite fetal pole or yolk sac is seen on today's study although a  structure which could represent a very subtle early yolk sac is noted.  This likely represents a gestation too early to see the fetal pole or  heartbeat although early intrauterine fetal demise is difficult  to  exclude. I recommend follow-up serial quantitative beta hCG evaluation  and ultrasound.         Assessments & Plan (with Medical Decision Making)       I have reviewed the nursing notes.    I have reviewed the findings, diagnosis, plan and need for follow up with the patient.  Patient with first trimester bleeding at this time unclear as to whether or not this represents a miscarriage versus spotting with early pregnancy.  Ultrasound reveals 5-week gestation possible sac with no obvious fetal pole I have discussed at length with patient and significant other that it is too early to tell exactly what is the etiology of the bleeding and she will receive a repeat hCG on Monday to determine if the hCG has doubled she understands this and will be following up with the [facility].  She will return to the emergency room if she has any marked increase in bleeding or pain.      Final diagnoses:   First trimester bleeding     IShanti, am serving as a trained medical scribe to document services personally performed by Haider Rosas MD, based on the provider's statements to me.      Haider WOODALL MD, was physically present and have reviewed and verified the accuracy of this note documented by Shanti Galaviz    2/8/2020   [facility] EMERGENCY DEPARTMENT     Haider Rosas MD  02/08/20 1149

## 2020-02-10 ENCOUNTER — TELEPHONE (OUTPATIENT)
Dept: OBGYN | Facility: CLINIC | Age: 40
End: 2020-02-10

## 2020-02-10 DIAGNOSIS — O20.9 VAGINAL BLEEDING AFFECTING EARLY PREGNANCY: ICD-10-CM

## 2020-02-10 DIAGNOSIS — Z34.91 ENCOUNTER FOR SUPERVISION OF NORMAL PREGNANCY IN FIRST TRIMESTER, UNSPECIFIED GRAVIDITY: Primary | ICD-10-CM

## 2020-02-10 LAB — B-HCG SERPL-ACNC: 158 IU/L (ref 0–5)

## 2020-02-10 PROCEDURE — 84702 CHORIONIC GONADOTROPIN TEST: CPT | Performed by: ADVANCED PRACTICE MIDWIFE

## 2020-02-10 PROCEDURE — 36415 COLL VENOUS BLD VENIPUNCTURE: CPT | Performed by: ADVANCED PRACTICE MIDWIFE

## 2020-02-10 NOTE — TELEPHONE ENCOUNTER
Received call from patient's , Maurice, in followup to visit to ED over the weekend for vaginal bleeding in early pregnancy.    SHELBI of 10/2/2020 by LMP making pregnancy 6w4d. Ultrasound was done in ED could not rule out MAB versus early intrauterine pregnancy.    Maurice states Taylor has had continued vaginal bleeding. Denies any uterine cramping or unilateral pain. Denies soaking a pad in an hour. Does state some small clots present. Denies symptomatic blood loss.    Discussed with Maurice that plan per ED note was to repeat HCG. Order will be placed for this. Discussed HCG will determine further plan. They will present for this lab today.

## 2020-02-10 NOTE — TELEPHONE ENCOUNTER
Spoke with Taylor about her hcg results. Drop Consistent with miscarriage. Discussed bleeding precautions. Instructed patient to take home pregnancy test in 3 weeks. Call back if still positive. Patient agreeable to plan.

## 2020-02-25 ENCOUNTER — OFFICE VISIT (OUTPATIENT)
Dept: OBGYN | Facility: CLINIC | Age: 40
End: 2020-02-25
Attending: PHYSICIAN ASSISTANT
Payer: COMMERCIAL

## 2020-02-25 VITALS
SYSTOLIC BLOOD PRESSURE: 101 MMHG | HEART RATE: 60 BPM | BODY MASS INDEX: 23.22 KG/M2 | DIASTOLIC BLOOD PRESSURE: 65 MMHG | HEIGHT: 61 IN | WEIGHT: 123 LBS

## 2020-02-25 DIAGNOSIS — Z31.41 FERTILITY TESTING: ICD-10-CM

## 2020-02-25 DIAGNOSIS — Z87.59 MISCARRIAGE WITHIN LAST 12 MONTHS: Primary | ICD-10-CM

## 2020-02-25 LAB
HCG UR QL: NEGATIVE
INTERNAL QC OK POCT: YES

## 2020-02-25 PROCEDURE — G0463 HOSPITAL OUTPT CLINIC VISIT: HCPCS | Mod: ZF

## 2020-02-25 PROCEDURE — 81025 URINE PREGNANCY TEST: CPT | Mod: ZF | Performed by: OBSTETRICS & GYNECOLOGY

## 2020-02-25 ASSESSMENT — ANXIETY QUESTIONNAIRES
3. WORRYING TOO MUCH ABOUT DIFFERENT THINGS: NOT AT ALL
5. BEING SO RESTLESS THAT IT IS HARD TO SIT STILL: NOT AT ALL
GAD7 TOTAL SCORE: 1
6. BECOMING EASILY ANNOYED OR IRRITABLE: NOT AT ALL
7. FEELING AFRAID AS IF SOMETHING AWFUL MIGHT HAPPEN: NOT AT ALL
2. NOT BEING ABLE TO STOP OR CONTROL WORRYING: NOT AT ALL
1. FEELING NERVOUS, ANXIOUS, OR ON EDGE: SEVERAL DAYS

## 2020-02-25 ASSESSMENT — MIFFLIN-ST. JEOR: SCORE: 1160.3

## 2020-02-25 ASSESSMENT — PATIENT HEALTH QUESTIONNAIRE - PHQ9
5. POOR APPETITE OR OVEREATING: NOT AT ALL
SUM OF ALL RESPONSES TO PHQ QUESTIONS 1-9: 4

## 2020-02-25 NOTE — PATIENT INSTRUCTIONS
On 3rd day of next period (3rd day of bleeding) come to the lab for blood work to check the ovary function.     Take a prenatal vitamin

## 2020-02-25 NOTE — NURSING NOTE
Chief Complaint   Patient presents with     Consult For     Discuss future pregnancies and concern about her health. Had recent miscarriage.       See LEDY Lay 2/25/2020

## 2020-02-25 NOTE — PROGRESS NOTES
"[facility]    CC: Follow up miscarriage    S: 39 year old  (2 TAb) here for follow from recent miscarriage. Has years of infertility, did not think she was able to conceive, and had spontaneous conception with this pregnancy. Unfortunately had bleeding and was seen in ED with diagnosis of SAB. Is frustrated by process, feels she should have been seen sooner. No further bleeding. Does desire pregnancy.     Had fertility evaluation done ~3 years ago- states ovaries were functioning well and semen analysis was normal.   Had 2 pregnancies as teenager with D&C x2  Had hemicolectomy as teenager for ?inflammation  Denies endometriosis or h/o pelvic infections  Unclear if had HSG in past      O: /65 (BP Location: Right arm, Patient Position: Chair)   Pulse 60   Ht 1.549 m (5' 1\")   Wt 55.8 kg (123 lb)   Breastfeeding No   BMI 23.24 kg/m    General: No distress    UPT negative    A:39 year old here to follow up recent SAB    P: Reviewed cycle return and expectations  Discussed increased risk of miscarriage due to age  Offered referral to [facility] for management as >40 with previous subfertility- declines at this time. Would like to see how things go  Cycle timing reviewed  Will get day 3 labs with next cycle   Continue folic acid  Discussed experience with SAB and phone calls. If issues recommend asking for appointment to discuss further.    Total time spent was 15 minutes, over 50% spent in counseling.    Tereza Han MD FACOG  "

## 2020-02-25 NOTE — LETTER
"2/25/2020       RE: Noemi Medrano  [address]  [place] [address]     Dear Colleague,    Thank you for referring your patient, Noemi Medrano, to the [facility]. Please see a copy of my visit note below.    [facility]    CC: Follow up miscarriage    S: 39 year old  (2 TAb) here for follow from recent miscarriage. Has years of infertility, did not think she was able to conceive, and had spontaneous conception with this pregnancy. Unfortunately had bleeding and was seen in ED with diagnosis of SAB. Is frustrated by process, feels she should have been seen sooner. No further bleeding. Does desire pregnancy.     Had fertility evaluation done ~3 years ago- states ovaries were functioning well and semen analysis was normal.   Had 2 pregnancies as teenager with D&C x2  Had hemicolectomy as teenager for ?inflammation  Denies endometriosis or h/o pelvic infections  Unclear if had HSG in past      O: /65 (BP Location: Right arm, Patient Position: Chair)   Pulse 60   Ht 1.549 m (5' 1\")   Wt 55.8 kg (123 lb)   Breastfeeding No   BMI 23.24 kg/m     General: No distress    UPT negative    A:39 year old here to follow up recent SAB    P: Reviewed cycle return and expectations  Discussed increased risk of miscarriage due to age  Offered referral to [facility] for management as >40 with previous subfertility- declines at this time. Would like to see how things go  Cycle timing reviewed  Will get day 3 labs with next cycle   Continue folic acid  Discussed experience with SAB and phone calls. If issues recommend asking for appointment to discuss further.    Total time spent was 15 minutes, over 50% spent in counseling.    Tereza Han MD FACOG    Again, thank you for allowing me to participate in the care of your patient.      Sincerely,    Tereza Han MD      "

## 2020-02-26 ASSESSMENT — ANXIETY QUESTIONNAIRES: GAD7 TOTAL SCORE: 1

## 2020-03-09 DIAGNOSIS — Z31.41 FERTILITY TESTING: ICD-10-CM

## 2020-03-09 LAB
ESTRADIOL SERPL-MCNC: 33 PG/ML
FSH SERPL-ACNC: 16.7 IU/L
TSH SERPL DL<=0.005 MIU/L-ACNC: 3.57 MU/L (ref 0.4–4)

## 2020-03-09 PROCEDURE — 83001 ASSAY OF GONADOTROPIN (FSH): CPT | Performed by: OBSTETRICS & GYNECOLOGY

## 2020-03-09 PROCEDURE — 82670 ASSAY OF TOTAL ESTRADIOL: CPT | Performed by: OBSTETRICS & GYNECOLOGY

## 2020-03-09 PROCEDURE — 83520 IMMUNOASSAY QUANT NOS NONAB: CPT | Performed by: OBSTETRICS & GYNECOLOGY

## 2020-03-09 PROCEDURE — 36415 COLL VENOUS BLD VENIPUNCTURE: CPT | Performed by: OBSTETRICS & GYNECOLOGY

## 2020-03-09 PROCEDURE — 84443 ASSAY THYROID STIM HORMONE: CPT | Performed by: OBSTETRICS & GYNECOLOGY

## 2020-03-11 ENCOUNTER — HEALTH MAINTENANCE LETTER (OUTPATIENT)
Age: 40
End: 2020-03-11

## 2020-03-11 LAB — MIS SERPL-MCNC: 0.41 NG/ML

## 2020-11-04 ENCOUNTER — VIRTUAL VISIT (OUTPATIENT)
Dept: FAMILY MEDICINE | Facility: CLINIC | Age: 40
End: 2020-11-04
Payer: COMMERCIAL

## 2020-11-04 DIAGNOSIS — N97.9 INFERTILITY, FEMALE: Primary | ICD-10-CM

## 2020-11-04 PROCEDURE — 99203 OFFICE O/P NEW LOW 30 MIN: CPT | Mod: 95 | Performed by: PHYSICIAN ASSISTANT

## 2020-11-04 NOTE — PROGRESS NOTES
"Noemi Medrano is a 40 year old female who is being evaluated via a billable video visit.      The patient has been notified of following:     \"This video visit will be conducted via a call between you and your physician/provider. We have found that certain health care needs can be provided without the need for an in-person physical exam.  This service lets us provide the care you need with a video conversation.  If a prescription is necessary we can send it directly to your pharmacy.  If lab work is needed we can place an order for that and you can then stop by our lab to have the test done at a later time.    Video visits are billed at different rates depending on your insurance coverage.  Please reach out to your insurance provider with any questions.    If during the course of the call the physician/provider feels a video visit is not appropriate, you will not be charged for this service.\"    Patient has given verbal consent for Video visit? Yes  How would you like to obtain your AVS? MyChart  If you are dropped from the video visit, the video invite should be resent to: Text to cell phone: [phone]  Will anyone else be joining your video visit? No    Subjective     Noemi Medrano is a 40 year old female who presents today via video visit for the following health issues:    HPI      Pt would like discuss infertility  She had a miscarriage last February  She has been trying to get pregnant for 4-5 months  She is having normal periods, 26-27 days per cycle  Denies severe pain with periods  She was pregnant over 10 years ago, she had a miscarriage at 7 or so weeks    She has no known health conditions  Her partner doesn't have any health conditions. He is 42 years old    She is currently on day 15 of cycle    Her partner had a normal semen analysis within the last year        She       How many servings of fruits and vegetables do you eat daily?  0-1    On average, how many sweetened beverages do you drink each " day (Examples: soda, juice, sweet tea, etc.  Do NOT count diet or artificially sweetened beverages)?   0    How many days per week do you exercise enough to make your heart beat faster? 3 or less    How many minutes a day do you exercise enough to make your heart beat faster? 9 or less    How many days per week do you miss taking your medication? 0           Video Start Time: 11:22        Review of Systems   CONSTITUTIONAL: NEGATIVE for fever, chills, change in weight  INTEGUMENTARY/SKIN: NEGATIVE for worrisome rashes, moles or lesions  EYES: NEGATIVE for vision changes or irritation  ENT/MOUTH: NEGATIVE for ear, mouth and throat problems  RESP: NEGATIVE for significant cough or SOB  BREAST: NEGATIVE for masses, tenderness or discharge  CV: NEGATIVE for chest pain, palpitations or peripheral edema  GI: NEGATIVE for nausea, abdominal pain, heartburn, or change in bowel habits  : NEGATIVE for frequency, dysuria, or hematuria  MUSCULOSKELETAL: NEGATIVE for significant arthralgias or myalgia  NEURO: NEGATIVE for weakness, dizziness or paresthesias  ENDOCRINE: NEGATIVE for temperature intolerance, skin/hair changes  HEME: NEGATIVE for bleeding problems  PSYCHIATRIC: NEGATIVE for changes in mood or affect      Objective           Vitals:  No vitals were obtained today due to virtual visit.    Physical Exam     GENERAL: Healthy, alert and no distress  EYES: Eyes grossly normal to inspection.  No discharge or erythema, or obvious scleral/conjunctival abnormalities.  RESP: No audible wheeze, cough, or visible cyanosis.  No visible retractions or increased work of breathing.    SKIN: Visible skin clear. No significant rash, abnormal pigmentation or lesions.  NEURO: Cranial nerves grossly intact.  Mentation and speech appropriate for age.  PSYCH: Mentation appears normal, affect normal/bright, judgement and insight intact, normal speech and appearance well-groomed.      Orders Only on 03/09/2020   Component Date Value Ref  Range Status     FSH 03/09/2020 16.7  IU/L Final     Estradiol 03/09/2020 33  pg/mL Final    Comment: Estradiol reference ranges for pre-menopausal  Follicular     pg/mL  Mid-cycle    pg/mL  Luteal          pg/mL       Anti-Mullerian Hormone 03/09/2020 0.412  <=6.282 ng/mL Final    Comment: (Note)  INTERPRETIVE INFORMATION: Anti-Mullerian Hormone  FEMALE:  6 months - 14 years: 0.256 - 6.345 ng/mL  15-17 years:         0.861 - 10.451 ng/mL  18-29 years:         0.401 - 16.015 ng/mL  30-39 years:         0.176 - 11.705 ng/mL  40-45 years:         6.282 ng/mL or less  46-50 years:         0.064 ng/mL or less  Post-menopausal:     0.003 ng/mL or less  MALE:  6-11 months:         56.677 - 495.299 ng/mL  1-6 years:           33.442 - 342.450 ng/mL  7-9 years:           20.245 - 189.781 ng/mL  10-12 years:         2.903 - 178.243 ng/mL  13 years and older:  2.079 - 30.656 ng/mL  Test developed and characteristics determined by [facility]. See Compliance Statement D: [employer]  Performed by [facility],  [address] [place] [id number] [phone]  [email or web address], Antonio Leroy MD, Lab. Director       TSH 03/09/2020 3.57  0.40 - 4.00 mU/L Final             ICD-10-CM    1. Infertility, female  N97.9 XR Hysterosalpingogram     Patient Instructions   Schedule HSG  Call to schedule with a reproductive endocrinologist  I recommend [facility] in [place]  Return to clinic for any new or worsening symptoms or go to ER Urgent care in off hours            Video-Visit Details    Type of service:  Video Visit    Video End Time:11:42    Originating Location (pt. Location): Home    Distant Location (provider location):  [facility] PRIMARY [facility]     Platform used for Video Visit: [employer]

## 2020-11-04 NOTE — PATIENT INSTRUCTIONS
Schedule HSG  Call to schedule with a reproductive endocrinologist  I recommend [facility] in [facility] [place]  Return to clinic for any new or worsening symptoms or go to ER Urgent care in off hours

## 2020-11-16 DIAGNOSIS — Z11.59 ENCOUNTER FOR SCREENING FOR OTHER VIRAL DISEASES: Primary | ICD-10-CM

## 2020-11-16 DIAGNOSIS — Z31.41 FERTILITY TESTING: Primary | ICD-10-CM

## 2020-11-21 DIAGNOSIS — Z11.59 ENCOUNTER FOR SCREENING FOR OTHER VIRAL DISEASES: ICD-10-CM

## 2020-11-21 DIAGNOSIS — Z31.41 FERTILITY TESTING: ICD-10-CM

## 2020-11-21 PROCEDURE — U0003 INFECTIOUS AGENT DETECTION BY NUCLEIC ACID (DNA OR RNA); SEVERE ACUTE RESPIRATORY SYNDROME CORONAVIRUS 2 (SARS-COV-2) (CORONAVIRUS DISEASE [COVID-19]), AMPLIFIED PROBE TECHNIQUE, MAKING USE OF HIGH THROUGHPUT TECHNOLOGIES AS DESCRIBED BY CMS-2020-01-R: HCPCS | Performed by: PHYSICIAN ASSISTANT

## 2020-11-22 LAB
LABORATORY COMMENT REPORT: NORMAL
SARS-COV-2 RNA SPEC QL NAA+PROBE: NEGATIVE
SARS-COV-2 RNA SPEC QL NAA+PROBE: NORMAL
SPECIMEN SOURCE: NORMAL
SPECIMEN SOURCE: NORMAL

## 2020-11-24 ENCOUNTER — HOSPITAL ENCOUNTER (OUTPATIENT)
Dept: GENERAL RADIOLOGY | Facility: CLINIC | Age: 40
End: 2020-11-24
Attending: PHYSICIAN ASSISTANT
Payer: COMMERCIAL

## 2020-11-24 ENCOUNTER — ALLIED HEALTH/NURSE VISIT (OUTPATIENT)
Dept: OBGYN | Facility: CLINIC | Age: 40
End: 2020-11-24
Payer: COMMERCIAL

## 2020-11-24 DIAGNOSIS — N97.9 INFERTILITY, FEMALE: ICD-10-CM

## 2020-11-24 DIAGNOSIS — Z01.812 PRE-PROCEDURE LAB EXAM: Primary | ICD-10-CM

## 2020-11-24 LAB
HCG UR QL: NEGATIVE
INTERNAL QC OK POCT: YES

## 2020-11-24 PROCEDURE — 255N000002 HC RX 255 OP 636: Performed by: PHYSICIAN ASSISTANT

## 2020-11-24 PROCEDURE — 999N000103 HC STATISTIC NO CHARGE FACILITY FEE

## 2020-11-24 PROCEDURE — 74740 X-RAY FEMALE GENITAL TRACT: CPT | Mod: 26 | Performed by: RADIOLOGY

## 2020-11-24 PROCEDURE — 99207 PR NO CHARGE NURSE ONLY: CPT

## 2020-11-24 PROCEDURE — 250N000009 HC RX 250

## 2020-11-24 PROCEDURE — 74740 X-RAY FEMALE GENITAL TRACT: CPT

## 2020-11-24 PROCEDURE — 81025 URINE PREGNANCY TEST: CPT

## 2020-11-24 RX ORDER — LIDOCAINE HYDROCHLORIDE 10 MG/ML
5 INJECTION, SOLUTION EPIDURAL; INFILTRATION; INTRACAUDAL; PERINEURAL ONCE
Status: COMPLETED | OUTPATIENT
Start: 2020-11-24 | End: 2020-11-24

## 2020-11-24 RX ORDER — LIDOCAINE HYDROCHLORIDE 10 MG/ML
INJECTION, SOLUTION EPIDURAL; INFILTRATION; INTRACAUDAL; PERINEURAL
Status: COMPLETED
Start: 2020-11-24 | End: 2020-11-24

## 2020-11-24 RX ORDER — IOPAMIDOL 510 MG/ML
100 INJECTION, SOLUTION INTRAVASCULAR ONCE
Status: COMPLETED | OUTPATIENT
Start: 2020-11-24 | End: 2020-11-24

## 2020-11-24 RX ADMIN — LIDOCAINE HYDROCHLORIDE 2 ML: 10 INJECTION, SOLUTION EPIDURAL; INFILTRATION; INTRACAUDAL; PERINEURAL at 15:06

## 2020-11-24 RX ADMIN — IOPAMIDOL 10 ML: 510 INJECTION, SOLUTION INTRAVASCULAR at 15:07

## 2021-01-03 ENCOUNTER — HEALTH MAINTENANCE LETTER (OUTPATIENT)
Age: 41
End: 2021-01-03

## 2021-01-07 ENCOUNTER — ANCILLARY PROCEDURE (OUTPATIENT)
Dept: ULTRASOUND IMAGING | Facility: CLINIC | Age: 41
End: 2021-01-07
Attending: PHYSICIAN ASSISTANT
Payer: COMMERCIAL

## 2021-01-07 DIAGNOSIS — R10.2 PELVIC PAIN IN FEMALE: ICD-10-CM

## 2021-01-07 PROCEDURE — 76830 TRANSVAGINAL US NON-OB: CPT | Mod: 26 | Performed by: OBSTETRICS & GYNECOLOGY

## 2021-01-07 PROCEDURE — 76830 TRANSVAGINAL US NON-OB: CPT

## 2021-01-07 NOTE — PROGRESS NOTES
"Taylor is a 40 year old who is being evaluated via a billable video visit.      How would you like to obtain your AVS? MyChart  If the video visit is dropped, the invitation should be resent by: Send to e-mail at: [email or web address]  Will anyone else be joining your video visit? No    Video Start Time: 10:06 AM  Assessment & Plan       ICD-10-CM    1. Pelvic pain in female  R10.2    2. Slow transit constipation  K59.01    3. Female fertility problem  N97.9        25 minutes spent on the date of the encounter doing chart review, history and exam, documentation and further activities as noted above         Tobacco Cessation:   reports that she has been smoking cigarettes. She started smoking about 23 years ago. She has been smoking about 0.25 packs per day. She has never used smokeless tobacco.    Advised stress can be contributing as well as her lower egg quality due to age and lower AMH level. Not interested in fertility procedures but would be interested in letrazole.     Patient Instructions   Magnesium citrate 1 bottle today  Start miralax tomorrow 1 cap in 8 ounces daily  Schedule with OB to discuss left ovary, and whether imaging is necessary and whether anyone is willing to prescribe letrazole for fertility.   Follow up with me in 1-2 weeks  Return to clinic for any new or worsening symptoms or go to ER Urgent care in off hours        No follow-ups on file.    Shelley Merino PA-C  [facility]    Janie Vazquez is a 40 year old who presents to clinic today for the following health issues     HPI   Follow-up after Ultrasound for results    \"Some weeks ago I had the HSG. The results are apparently fine, but I am concerned since after the exam the area around my left ovary hurts. At least a couple of times per day I feel something similar to a soft cramp. I do not know if this might be a reaction to the exam because it was EXTREMELY painful for me. I had never felt that level " "of pain before and I am concerned about secondary effects. Is this kind of pain normal several weeks after the test?\"      Working on her doctorate right now  Stress level is 7-8/10      12/10 in the afternoon (7 hours after the HSG), she got a very painful cramp in the left lower pelvic area.   Now she feels this everyday, at least a few times daily, it feels like a soft cramp    Reports she's having a bowel movement every 1-2 days.   Reports constipation has always been an issue  It can be difficult to get it out  She is not taking any stool softners    Doesn't understand why it has been so difficult to get pregnant even though all of her work up so far has been \"normal.\"                Review of Systems   CONSTITUTIONAL: NEGATIVE for fever, chills, change in weight  INTEGUMENTARY/SKIN: NEGATIVE for worrisome rashes, moles or lesions  EYES: NEGATIVE for vision changes or irritation  ENT/MOUTH: NEGATIVE for ear, mouth and throat problems  RESP: NEGATIVE for significant cough or SOB  BREAST: NEGATIVE for masses, tenderness or discharge  CV: NEGATIVE for chest pain, palpitations or peripheral edema  GI: NEGATIVE for nausea, abdominal pain, heartburn, or change in bowel habits  : NEGATIVE for frequency, dysuria, or hematuria  MUSCULOSKELETAL: NEGATIVE for significant arthralgias or myalgia  NEURO: NEGATIVE for weakness, dizziness or paresthesias  ENDOCRINE: NEGATIVE for temperature intolerance, skin/hair changes  HEME: NEGATIVE for bleeding problems  PSYCHIATRIC: NEGATIVE for changes in mood or affect      Objective           Vitals:  No vitals were obtained today due to virtual visit.    Physical Exam   GENERAL: Healthy, alert and no distress  EYES: Eyes grossly normal to inspection.  No discharge or erythema, or obvious scleral/conjunctival abnormalities.  RESP: No audible wheeze, cough, or visible cyanosis.  No visible retractions or increased work of breathing.    SKIN: Visible skin clear. No significant rash, " abnormal pigmentation or lesions.  NEURO: Cranial nerves grossly intact.  Mentation and speech appropriate for age.  PSYCH: Mentation appears normal, affect normal/bright, judgement and insight intact, normal speech and appearance well-groomed.    Allied Health/Nurse Visit on 11/24/2020   Component Date Value Ref Range Status     HCG Qual Urine 11/24/2020 Negative  neg Final     Internal QC OK 11/24/2020 Yes   Final               Video-Visit Details    Type of service:  Video Visit    Video End Time:10:22 AM    Originating Location (pt. Location): Home    Distant Location (provider location):  [facility]     Platform used for Video Visit: [employer]

## 2021-01-08 ENCOUNTER — VIRTUAL VISIT (OUTPATIENT)
Dept: FAMILY MEDICINE | Facility: CLINIC | Age: 41
End: 2021-01-08
Payer: COMMERCIAL

## 2021-01-08 DIAGNOSIS — N97.9 FEMALE FERTILITY PROBLEM: ICD-10-CM

## 2021-01-08 DIAGNOSIS — K59.01 SLOW TRANSIT CONSTIPATION: ICD-10-CM

## 2021-01-08 DIAGNOSIS — R10.2 PELVIC PAIN IN FEMALE: Primary | ICD-10-CM

## 2021-01-08 PROCEDURE — 99214 OFFICE O/P EST MOD 30 MIN: CPT | Mod: 95 | Performed by: PHYSICIAN ASSISTANT

## 2021-01-08 RX ORDER — SWAB
1 SWAB, NON-MEDICATED MISCELLANEOUS DAILY
COMMUNITY
End: 2021-04-14

## 2021-01-08 NOTE — PATIENT INSTRUCTIONS
Magnesium citrate 1 bottle today  Start miralax tomorrow 1 cap in 8 ounces daily  Schedule with OB to discuss left ovary, and whether imaging is necessary and whether anyone is willing to prescribe letrazole for fertility.   Follow up with me in 1-2 weeks  Return to clinic for any new or worsening symptoms or go to ER Urgent care in off hours

## 2021-03-06 ENCOUNTER — MYC MEDICAL ADVICE (OUTPATIENT)
Dept: OBGYN | Facility: CLINIC | Age: 41
End: 2021-03-06

## 2021-03-06 DIAGNOSIS — Z34.91 ENCOUNTER FOR SUPERVISION OF NORMAL PREGNANCY IN FIRST TRIMESTER, UNSPECIFIED GRAVIDITY: Primary | ICD-10-CM

## 2021-03-08 DIAGNOSIS — Z34.91 ENCOUNTER FOR SUPERVISION OF NORMAL PREGNANCY IN FIRST TRIMESTER, UNSPECIFIED GRAVIDITY: ICD-10-CM

## 2021-03-08 LAB — B-HCG SERPL-ACNC: ABNORMAL IU/L (ref 0–5)

## 2021-03-08 PROCEDURE — 36415 COLL VENOUS BLD VENIPUNCTURE: CPT | Performed by: OBSTETRICS & GYNECOLOGY

## 2021-03-08 PROCEDURE — 84702 CHORIONIC GONADOTROPIN TEST: CPT | Performed by: OBSTETRICS & GYNECOLOGY

## 2021-03-23 ENCOUNTER — ANCILLARY PROCEDURE (OUTPATIENT)
Dept: ULTRASOUND IMAGING | Facility: CLINIC | Age: 41
End: 2021-03-23
Attending: OBSTETRICS & GYNECOLOGY
Payer: COMMERCIAL

## 2021-03-23 ENCOUNTER — TELEPHONE (OUTPATIENT)
Dept: OBGYN | Facility: CLINIC | Age: 41
End: 2021-03-23

## 2021-03-23 DIAGNOSIS — Z32.01 PREGNANCY TEST POSITIVE: ICD-10-CM

## 2021-03-23 DIAGNOSIS — Z32.01 PREGNANCY TEST POSITIVE: Primary | ICD-10-CM

## 2021-03-23 DIAGNOSIS — Z34.91 ENCOUNTER FOR SUPERVISION OF NORMAL PREGNANCY IN FIRST TRIMESTER, UNSPECIFIED GRAVIDITY: ICD-10-CM

## 2021-03-23 LAB — B-HCG SERPL-ACNC: ABNORMAL IU/L (ref 0–5)

## 2021-03-23 PROCEDURE — 84702 CHORIONIC GONADOTROPIN TEST: CPT | Performed by: OBSTETRICS & GYNECOLOGY

## 2021-03-23 PROCEDURE — 36415 COLL VENOUS BLD VENIPUNCTURE: CPT | Performed by: OBSTETRICS & GYNECOLOGY

## 2021-03-23 PROCEDURE — 76817 TRANSVAGINAL US OBSTETRIC: CPT | Mod: 26 | Performed by: OBSTETRICS & GYNECOLOGY

## 2021-03-23 PROCEDURE — 76817 TRANSVAGINAL US OBSTETRIC: CPT

## 2021-03-23 NOTE — TELEPHONE ENCOUNTER
Called patient to discuss US results.  Appearance of pregnancy concerning for possible failed/abnormal pregnancy but not yet meeting diagnostic criteria.  Did discuss repeating BhCG to see trend.  Considerations for repeat US in 1 week to evaluate for viability definitively at that time.  Discussed signs/symptoms of pregnancy loss and symptoms to report.  Although very sad about findings today, agreeable to plan and wants to ensure her health is ok.    Shea Moore MD

## 2021-03-24 ENCOUNTER — PREP FOR PROCEDURE (OUTPATIENT)
Dept: OBGYN | Facility: CLINIC | Age: 41
End: 2021-03-24

## 2021-03-24 ENCOUNTER — TELEPHONE (OUTPATIENT)
Dept: OBGYN | Facility: CLINIC | Age: 41
End: 2021-03-24

## 2021-03-24 DIAGNOSIS — O02.1 MISSED ABORTION: Primary | ICD-10-CM

## 2021-03-24 RX ORDER — ACETAMINOPHEN 325 MG/1
975 TABLET ORAL ONCE
Status: CANCELLED | OUTPATIENT
Start: 2021-03-24 | End: 2021-03-24

## 2021-03-24 RX ORDER — DOXYCYCLINE 100 MG/1
200 CAPSULE ORAL ONCE
Status: CANCELLED | OUTPATIENT
Start: 2021-03-24 | End: 2021-03-24

## 2021-03-24 NOTE — TELEPHONE ENCOUNTER
Call received from emergency line from patient's . States she is having painful cramping and spotting and is wondering if she can take anything for pain. States she has decided to move forward with D&C for presumed MAB. Advised that she can take OTC Tylenol and ibuprofen as directed. Advised that if she has severe pain or heavy bleeding, soaking a pad front to back, side to side in less than an hour, to seek emergency care. He verbalized understanding and agreement.

## 2021-03-25 ENCOUNTER — TELEPHONE (OUTPATIENT)
Dept: OBGYN | Facility: CLINIC | Age: 41
End: 2021-03-25

## 2021-03-25 DIAGNOSIS — O02.1 MISSED ABORTION: Primary | ICD-10-CM

## 2021-03-25 DIAGNOSIS — O03.9 SPONTANEOUS ABORTION: Primary | ICD-10-CM

## 2021-03-25 DIAGNOSIS — Z01.812 PRE-PROCEDURE LAB EXAM: ICD-10-CM

## 2021-03-25 NOTE — TELEPHONE ENCOUNTER
Taylor is calling because her D&C is not scheduled yet (Margo will be working on this as backup ), and her bleeding is increasing.  It is not excessively heavy, has just progressed from brown to red.  She would like to speak with her doctor, has questions.

## 2021-03-26 RX ORDER — OXYCODONE HYDROCHLORIDE 5 MG/1
5-10 TABLET ORAL EVERY 4 HOURS PRN
Qty: 20 TABLET | Refills: 0 | Status: SHIPPED | OUTPATIENT
Start: 2021-03-25 | End: 2021-03-29

## 2021-03-26 NOTE — TELEPHONE ENCOUNTER
Received call from patient's  stating patient is having a miscarriage and is having difficulty with pain control.  Has taken ibuprofen twice today, most recently 600 mg in the last hour without much relief.  Tried Tylenol as well without benefit.  Is wondering if there is anything further she could have.  Is having some bleeding, but not anything too heavy at this time.  No other concerning symptoms.  Offered oxycodone versus ED.  Patient accepting of trial of oxycodone given bleeding is manageable and it is more needing something more for pain.  Oxycodone sent to requested 24 hour pharmacy near home.  Discussed symptoms to monitor and when to repeat to ED.  Patient's  understands.  Expressed condolences for their loss.    Shea Moore MD

## 2021-03-29 ENCOUNTER — TELEPHONE (OUTPATIENT)
Dept: OBGYN | Facility: CLINIC | Age: 41
End: 2021-03-29

## 2021-03-29 DIAGNOSIS — O03.9 THREATENED ABORTION, DELIVERED: Primary | ICD-10-CM

## 2021-03-29 PROBLEM — O02.1 MISSED ABORTION: Status: ACTIVE | Noted: 2021-03-29

## 2021-03-29 RX ORDER — IBUPROFEN 600 MG/1
600 TABLET, FILM COATED ORAL EVERY 6 HOURS PRN
COMMUNITY
End: 2021-04-14

## 2021-03-29 NOTE — TELEPHONE ENCOUNTER
Confirmed surgery date, time and location, 3/30/21, arrival time at 12:00p.m with nothing to eat eight hours before scheduled surgery time, clear liquids up to two hours before.

## 2021-03-30 ENCOUNTER — HOSPITAL ENCOUNTER (OUTPATIENT)
Facility: CLINIC | Age: 41
Discharge: HOME | End: 2021-03-30
Attending: OBSTETRICS & GYNECOLOGY | Admitting: OBSTETRICS & GYNECOLOGY
Payer: COMMERCIAL

## 2021-03-30 ENCOUNTER — ANCILLARY PROCEDURE (OUTPATIENT)
Dept: ULTRASOUND IMAGING | Facility: CLINIC | Age: 41
End: 2021-03-30
Attending: OBSTETRICS & GYNECOLOGY
Payer: COMMERCIAL

## 2021-03-30 VITALS
DIASTOLIC BLOOD PRESSURE: 56 MMHG | BODY MASS INDEX: 25.2 KG/M2 | HEART RATE: 73 BPM | SYSTOLIC BLOOD PRESSURE: 130 MMHG | TEMPERATURE: 98.1 F | RESPIRATION RATE: 16 BRPM | WEIGHT: 125 LBS | OXYGEN SATURATION: 100 % | HEIGHT: 59 IN

## 2021-03-30 DIAGNOSIS — O03.9 THREATENED ABORTION, DELIVERED: ICD-10-CM

## 2021-03-30 DIAGNOSIS — O02.1 MISSED ABORTION: ICD-10-CM

## 2021-03-30 DIAGNOSIS — Z01.812 PRE-PROCEDURE LAB EXAM: ICD-10-CM

## 2021-03-30 LAB
GLUCOSE BLDC GLUCOMTR-MCNC: 82 MG/DL (ref 70–99)
LABORATORY COMMENT REPORT: NORMAL
SARS-COV-2 RNA RESP QL NAA+PROBE: NEGATIVE
SARS-COV-2 RNA RESP QL NAA+PROBE: NORMAL
SPECIMEN SOURCE: NORMAL
SPECIMEN SOURCE: NORMAL

## 2021-03-30 PROCEDURE — U0005 INFEC AGEN DETEC AMPLI PROBE: HCPCS | Mod: 90 | Performed by: PATHOLOGY

## 2021-03-30 PROCEDURE — 999N000001 HC CANCELLED SURGERY UP TO 15 MINS: Performed by: OBSTETRICS & GYNECOLOGY

## 2021-03-30 PROCEDURE — 999N000141 HC STATISTIC PRE-PROCEDURE NURSING ASSESSMENT: Performed by: OBSTETRICS & GYNECOLOGY

## 2021-03-30 PROCEDURE — 76817 TRANSVAGINAL US OBSTETRIC: CPT | Mod: 26 | Performed by: OBSTETRICS & GYNECOLOGY

## 2021-03-30 PROCEDURE — U0003 INFECTIOUS AGENT DETECTION BY NUCLEIC ACID (DNA OR RNA); SEVERE ACUTE RESPIRATORY SYNDROME CORONAVIRUS 2 (SARS-COV-2) (CORONAVIRUS DISEASE [COVID-19]), AMPLIFIED PROBE TECHNIQUE, MAKING USE OF HIGH THROUGHPUT TECHNOLOGIES AS DESCRIBED BY CMS-2020-01-R: HCPCS | Mod: 90 | Performed by: PATHOLOGY

## 2021-03-30 PROCEDURE — 82962 GLUCOSE BLOOD TEST: CPT

## 2021-03-30 PROCEDURE — 76817 TRANSVAGINAL US OBSTETRIC: CPT

## 2021-03-30 RX ORDER — NALOXONE HYDROCHLORIDE 0.4 MG/ML
0.2 INJECTION, SOLUTION INTRAMUSCULAR; INTRAVENOUS; SUBCUTANEOUS
Status: CANCELLED | OUTPATIENT
Start: 2021-03-30 | End: 2021-03-31

## 2021-03-30 RX ORDER — ACETAMINOPHEN 325 MG/1
975 TABLET ORAL ONCE
Status: DISCONTINUED | OUTPATIENT
Start: 2021-03-30 | End: 2021-03-30 | Stop reason: CLARIF

## 2021-03-30 RX ORDER — ONDANSETRON 2 MG/ML
4 INJECTION INTRAMUSCULAR; INTRAVENOUS EVERY 30 MIN PRN
Status: CANCELLED | OUTPATIENT
Start: 2021-03-30

## 2021-03-30 RX ORDER — SODIUM CHLORIDE, SODIUM LACTATE, POTASSIUM CHLORIDE, CALCIUM CHLORIDE 600; 310; 30; 20 MG/100ML; MG/100ML; MG/100ML; MG/100ML
INJECTION, SOLUTION INTRAVENOUS CONTINUOUS
Status: CANCELLED | OUTPATIENT
Start: 2021-03-30

## 2021-03-30 RX ORDER — NALOXONE HYDROCHLORIDE 0.4 MG/ML
0.4 INJECTION, SOLUTION INTRAMUSCULAR; INTRAVENOUS; SUBCUTANEOUS
Status: CANCELLED | OUTPATIENT
Start: 2021-03-30 | End: 2021-03-31

## 2021-03-30 RX ORDER — HYDRALAZINE HYDROCHLORIDE 20 MG/ML
2.5-5 INJECTION INTRAMUSCULAR; INTRAVENOUS EVERY 10 MIN PRN
Status: CANCELLED | OUTPATIENT
Start: 2021-03-30

## 2021-03-30 RX ORDER — ONDANSETRON 4 MG/1
4 TABLET, ORALLY DISINTEGRATING ORAL EVERY 30 MIN PRN
Status: CANCELLED | OUTPATIENT
Start: 2021-03-30

## 2021-03-30 RX ORDER — METOPROLOL TARTRATE 1 MG/ML
1-2 INJECTION, SOLUTION INTRAVENOUS EVERY 5 MIN PRN
Status: CANCELLED | OUTPATIENT
Start: 2021-03-30

## 2021-03-30 RX ORDER — FENTANYL CITRATE 50 UG/ML
25-50 INJECTION, SOLUTION INTRAMUSCULAR; INTRAVENOUS
Status: CANCELLED | OUTPATIENT
Start: 2021-03-30

## 2021-03-30 RX ORDER — DOXYCYCLINE 100 MG/1
200 CAPSULE ORAL ONCE
Status: DISCONTINUED | OUTPATIENT
Start: 2021-03-30 | End: 2021-03-30 | Stop reason: CLARIF

## 2021-03-30 ASSESSMENT — MIFFLIN-ST. JEOR: SCORE: 1139.75

## 2021-03-30 NOTE — PROGRESS NOTES
Called by RN to speak with pt in room.  Wants to discuss if surgery is needed.  Pt reports had heavy cramping and bleeding like a period 4 days prior to admission. Breast tenderness has resolved and had an ultrasound today that showed thickened EMS but no clear POC.  Discussed likely cause of missed AB in a 40 year old is trisomy.  Encouraged her to continue PNV if she desires to try again. RTC in 3 weeks for UPT and discussion of further reproduction.    Case coancelled    Kenna Garcia MD

## 2021-03-30 NOTE — OR NURSING
Procedure canceled by Dr. Garcia in preop related to patient's ultrasound results. 15 minutes total spent with patient.

## 2021-04-14 ENCOUNTER — OFFICE VISIT (OUTPATIENT)
Dept: OBGYN | Facility: CLINIC | Age: 41
End: 2021-04-14
Payer: COMMERCIAL

## 2021-04-14 VITALS
DIASTOLIC BLOOD PRESSURE: 68 MMHG | SYSTOLIC BLOOD PRESSURE: 105 MMHG | BODY MASS INDEX: 24.87 KG/M2 | HEART RATE: 82 BPM | WEIGHT: 125 LBS

## 2021-04-14 DIAGNOSIS — N93.9 VAGINAL BLEEDING: Primary | ICD-10-CM

## 2021-04-14 LAB
ERYTHROCYTE [DISTWIDTH] IN BLOOD BY AUTOMATED COUNT: 12.2 % (ref 10–15)
HCT VFR BLD AUTO: 43.4 % (ref 35–47)
HGB BLD-MCNC: 14.8 G/DL (ref 11.7–15.7)
MCH RBC QN AUTO: 31.1 PG (ref 26.5–33)
MCHC RBC AUTO-ENTMCNC: 34.1 G/DL (ref 31.5–36.5)
MCV RBC AUTO: 91 FL (ref 78–100)
PLATELET # BLD AUTO: 257 10E9/L (ref 150–450)
RBC # BLD AUTO: 4.76 10E12/L (ref 3.8–5.2)
WBC # BLD AUTO: 7.4 10E9/L (ref 4–11)

## 2021-04-14 PROCEDURE — 99213 OFFICE O/P EST LOW 20 MIN: CPT | Mod: GE | Performed by: OBSTETRICS & GYNECOLOGY

## 2021-04-14 PROCEDURE — 36415 COLL VENOUS BLD VENIPUNCTURE: CPT | Performed by: OBSTETRICS & GYNECOLOGY

## 2021-04-14 PROCEDURE — 85027 COMPLETE CBC AUTOMATED: CPT | Performed by: OBSTETRICS & GYNECOLOGY

## 2021-04-14 PROCEDURE — G0463 HOSPITAL OUTPT CLINIC VISIT: HCPCS

## 2021-04-14 ASSESSMENT — PATIENT HEALTH QUESTIONNAIRE - PHQ9
5. POOR APPETITE OR OVEREATING: NOT AT ALL
SUM OF ALL RESPONSES TO PHQ QUESTIONS 1-9: 4

## 2021-04-14 ASSESSMENT — ANXIETY QUESTIONNAIRES
5. BEING SO RESTLESS THAT IT IS HARD TO SIT STILL: NOT AT ALL
3. WORRYING TOO MUCH ABOUT DIFFERENT THINGS: NOT AT ALL
GAD7 TOTAL SCORE: 0
7. FEELING AFRAID AS IF SOMETHING AWFUL MIGHT HAPPEN: NOT AT ALL
1. FEELING NERVOUS, ANXIOUS, OR ON EDGE: NOT AT ALL
2. NOT BEING ABLE TO STOP OR CONTROL WORRYING: NOT AT ALL
6. BECOMING EASILY ANNOYED OR IRRITABLE: NOT AT ALL

## 2021-04-14 ASSESSMENT — PAIN SCALES - GENERAL: PAINLEVEL: MILD PAIN (2)

## 2021-04-14 NOTE — PROGRESS NOTES
Progress Note    SUBJECTIVE:  Noemi Medrano is an 40 year old, , who is here for follow-up after recent SAB. She reports that this experience was very painful physically and she was frustrated that she was not able to have her D&C. She had intermittent bleeding for several weeks after SAB. She notes return of menses yesterday which is heavier than average flow for her requiring a pad change every 3 hours. She notes some lightheadedness and dizziness and reports feeling week and tired. She thinks that she may pursue pregnancy at the end of this year. She does not desire fertility intervention or further work up at this time for pregnancy loss. She complains of chronic constipation for which she takes Psyllium. She has previously tried Miralax, Senna, Colace without improvement. Used mag citrate with relief in the past. She is continuing to take Folic Acid and is eating an iron rich diet.     Declined .      Menstrual History:  Menstrual History 2/8/2020 4/14/2021   LAST MENSTRUAL PERIOD 12/23/2019 4/13/2021       HISTORY:  Prescription Medications as of 4/14/2021       Rx Number Disp Refills Start End Last Dispensed Date Next Fill Date Owning Pharmacy    ibuprofen (ADVIL/MOTRIN) 600 MG tablet            Sig: Take 600 mg by mouth every 6 hours as needed for moderate pain    Class: Historical    Route: Oral    vitamin D3 (CHOLECALCIFEROL) 10 MCG (400 UNIT) capsule        [facility] [place] [place] [facility] [address] [place] [address] [address]    Sig: Take 1 capsule by mouth daily    Class: Historical    Route: Oral        Allergies   Allergen Reactions     Sulfa Drugs Rash       There is no immunization history on file for this patient.    No past medical history on file.  Past Surgical History:   Procedure Laterality Date     APPENDECTOMY  1996     COLON SURGERY  1996     No family history on file.  Social History     Socioeconomic History     Marital status:      Spouse name: Not on file      Number of children: Not on file     Years of education: Not on file     Highest education level: Not on file   Occupational History     Not on file   Social Needs     Financial resource strain: Not on file     Food insecurity     Worry: Not on file     Inability: Not on file     Transportation needs     Medical: Not on file     Non-medical: Not on file   Tobacco Use     Smoking status: Current Every Day Smoker     Packs/day: 0.25     Types: Cigarettes     Start date: 1/1/1998     Smokeless tobacco: Never Used   Substance and Sexual Activity     Alcohol use: Not Currently     Alcohol/week: 2.0 - 4.0 standard drinks     Types: 1 - 3 Glasses of wine, 1 Shots of liquor per week     Frequency: 2-4 times a month     Drinks per session: 3 or 4     Binge frequency: Monthly     Drug use: No     Sexual activity: Yes     Partners: Male       ROS  ROS: 10 point ROS neg other than the symptoms noted above in the HPI.    EXAM:  Blood pressure 105/68, pulse 82, weight 56.7 kg (125 lb), last menstrual period 04/13/2021, not currently breastfeeding. Body mass index is 24.87 kg/m .  General - pleasant female in no acute distress.  Skin - no suspicious lesions or rashes  Lungs - no increased work of breathing  Heart - regular rate, well perfused    ASSESSMENT/PLAN: 39yo  presents s/p SAB.       SAB:  - Recent vaginal bleeding accompanied by lightheadedness or dizziness. Draw Hgb today  -Discussed that miscarriage is the most common complication of early pregnancy.  An estimated 8-20% of clinically recognized pregnancies less than 20 weeks of gestation will miscarry.  80% of miscarriages happen in the first 12 weeks of gestation.  Reviewed relevant risk factors for miscarriage including:    Advancing maternal age which is the most important risk factor in determining miscarriage risk    Previous miscarriage: the risk of future miscarriage after one miscarriage is 20%, after 2 consecutive miscarriages it is 28% and after three or  more consecutive miscarriages it is 43%  Chromosomal abnormalities account for approximately 50% of all miscarriages and the earlier the gestational age at miscarriage the higher the incidence.  Autosomal trisomies make up 52% (with trisomy 16 being the most frequent), monosomy X 19%, and polyploidies 22%.   Congenital anomalies are another etiology of miscarriage.  Maternal uterine anomalies, such as a septum ,can increase the risk of miscarriage, prior US does not demonstrate obvious uterine abnormalities.  Poorly controlled thyroid disease or diabetes increase the risk of miscarriage. Last TSH WNL although >2.5.  Acute maternal infection can lead to miscarriage.  There are also miscarriages that are unexplained.      Very briefly reviewed recurrent pregnancy loss (RPL). While approximately 15% of women experience 1 miscarriage, only 2% experience 2 consecutive losses and less than 1% experience 3 consecutive losses.   In addition to the above risk factors for pregnancy loss the antiphospholipid antibody syndrome and parental karyotype abnormalities must be considered in women with RPL. Consider further workup if patient desires to pursue pregnancy in the future.     Recommendations:    Parental karyotype    Evaluation of the uterine cavity with sonohysterogram, hysterosalpingogram, hysteroscopy and/or 3D transvaginal ultrasound    Evaluation for possible antiphospholipid antibody syndrome with anticardiolipin antibody (IgG and IgM) titer, lupus anticoagulant, and beta-2 microglobulin performed twice, six to eight weeks apart    Evaluation of thyroid function with TSH and TPO      Constipation:   -Has tried a broad variety of PO medications. Mg citrate worked well for her. Recommended trial of Milk of Magnesia      Additional teaching done at this visit regarding preconception, encouraged continued PNV with Folic Acid if considering future pregnancy.    Follow-up as needed.    Discussed with Dr. Emely Hardwick  Myhre, DO  Obstetrics and Gynecology, PGY-4  April 14, 2021, 3:45 PM     The Patient was seen in [facility] by MYHRE, ALICIA.  I reviewed the history & exam. Assessment and plan were jointly made.    Tereza Han MD

## 2021-04-14 NOTE — PATIENT INSTRUCTIONS
Please have hemoglobin drawn today. We will contact you via MyChart or Telephone with results.    You may try Milk of Magnesia for constipation.     Please return in the summer if you are considering pursuing pregnancy.

## 2021-04-14 NOTE — NURSING NOTE
Chief Complaint   Patient presents with     Surgical Followup     MAB 3/30/2021   Daksha Fraire LPN

## 2021-04-15 ASSESSMENT — ANXIETY QUESTIONNAIRES: GAD7 TOTAL SCORE: 0

## 2021-04-25 ENCOUNTER — HEALTH MAINTENANCE LETTER (OUTPATIENT)
Age: 41
End: 2021-04-25

## 2021-08-16 ENCOUNTER — TRANSFERRED RECORDS (OUTPATIENT)
Dept: HEALTH INFORMATION MANAGEMENT | Facility: CLINIC | Age: 41
End: 2021-08-16

## 2021-08-18 ENCOUNTER — TRANSFERRED RECORDS (OUTPATIENT)
Dept: HEALTH INFORMATION MANAGEMENT | Facility: CLINIC | Age: 41
End: 2021-08-18

## 2021-08-25 ENCOUNTER — MEDICAL CORRESPONDENCE (OUTPATIENT)
Dept: HEALTH INFORMATION MANAGEMENT | Facility: CLINIC | Age: 41
End: 2021-08-25

## 2021-09-07 ENCOUNTER — TRANSCRIBE ORDERS (OUTPATIENT)
Dept: OTHER | Age: 41
End: 2021-09-07

## 2021-09-07 DIAGNOSIS — R19.7 DIARRHEA: ICD-10-CM

## 2021-09-07 DIAGNOSIS — R10.84 ABDOMINAL PAIN, GENERALIZED: ICD-10-CM

## 2021-09-07 DIAGNOSIS — R19.5 FECAL OCCULT BLOOD TEST POSITIVE: Primary | ICD-10-CM

## 2021-09-09 ENCOUNTER — APPOINTMENT (OUTPATIENT)
Dept: INTERPRETER SERVICES | Facility: CLINIC | Age: 41
End: 2021-09-09
Payer: COMMERCIAL

## 2021-10-10 ENCOUNTER — HEALTH MAINTENANCE LETTER (OUTPATIENT)
Age: 41
End: 2021-10-10

## 2021-10-27 ENCOUNTER — TRANSFERRED RECORDS (OUTPATIENT)
Dept: HEALTH INFORMATION MANAGEMENT | Facility: CLINIC | Age: 41
End: 2021-10-27
Payer: COMMERCIAL

## 2021-11-01 ENCOUNTER — MEDICAL CORRESPONDENCE (OUTPATIENT)
Dept: HEALTH INFORMATION MANAGEMENT | Facility: CLINIC | Age: 41
End: 2021-11-01
Payer: COMMERCIAL

## 2021-11-03 ENCOUNTER — TRANSCRIBE ORDERS (OUTPATIENT)
Dept: OTHER | Age: 41
End: 2021-11-03

## 2021-11-03 DIAGNOSIS — E06.3 AUTOIMMUNE THYROIDITIS: Primary | ICD-10-CM

## 2021-11-05 ENCOUNTER — TELEPHONE (OUTPATIENT)
Dept: ENDOCRINOLOGY | Facility: CLINIC | Age: 41
End: 2021-11-05

## 2021-11-30 NOTE — TELEPHONE ENCOUNTER
RECORDS RECEIVED FROM: external/Received    DATE RECEIVED: 12.8.21   NOTES (FOR ALL VISITS) STATUS DETAILS   OFFICE NOTES from referring provider In process 11.3.21 Martha Knight   OFFICE NOTES from other specialist Received  12.1.21 martha      ED NOTES N/A    OPERATIVE REPORT  (thyroid, pituitary, adrenal, parathyroid) N/A    MEDICATION LIST N/A    IMAGING      DEXASCAN N/A    MRI (BRAIN) N/A    XR (Chest) N/A    CT (HEAD/NECK/CHEST/ABDOMEN) N/A    NUCLEAR  N/A    ULTRASOUND (HEAD/NECK) N/A    LABS     DIABETES: HBGA1C, CREATININE, FASTING LIPIDS, MICROALBUMIN URINE, POTASSIUM, TSH, T4    THYROID: TSH, T4, CBC, THYRODLONULIN, TOTAL T3, FREE T4, CALCITONIN, CEA Internal/Received            Action 11.30.21 MJ   Action Taken Sent request to Martha for all related medical records.-- received-

## 2021-11-30 NOTE — PROGRESS NOTES
"[facility]  Gynecology Problem Visit    SUBJECTIVE    Noemi Medrano is a 41 year old  who is here for evaluation of recurrent pregnancy loss. Earlier this year, Taylor had a 2nd miscarriage. She last saw Dr. Nelson in April, and the evaluation of RPL was reviewed at that time. Taylor is now ready to move forward with testing.     Taylor does note that recently, she visited a provider for dizziness, and was diagnosed with thyroid disease. She has an appointment with an endocrinologist soon.    Taylor notes her periods continue to be regular/monthly and her ovulation continues to be consistent.     Taylor and her  leave for a research trip to [place] in 2 weeks and will be there for 6 months.    Her LMP is: No LMP recorded. (Menstrual status: Missed (s)).    PAST MEDICAL HISTORY  No past medical history on file.    MEDICATIONS  No current outpatient medications on file.     No current facility-administered medications for this visit.       ALLERGIES  Allergies   Allergen Reactions     Sulfa Drugs Rash       REVIEW OF SYSTEMS  A 10 point review of systems including Constitutional, Eyes, Respiratory, Cardiovascular, Gastroenterology, Genitourinary, Integumentary, Musculoskeletal, and Psychiatric, were all negative, except for pertinent positives noted in the above HPI.    OBJECTIVE  /72   Pulse 71   Ht 1.51 m (4' 11.45\")   Wt 55.4 kg (122 lb 1.6 oz)   LMP 2021   BMI 24.29 kg/m      General: Alert, without distress   HEENT: normocephalic, without obvious abnormality   Examination deferred today     Component      Latest Ref Rng & Units 3/9/2020   FSH      IU/L 16.7   Estradiol      pg/mL 33   Anti-Mullerian Hormone      <=6.282 ng/mL 0.412   TSH      0.40 - 4.00 mU/L 3.57     Hysterosalpingogram  EXAMINATION: Hysterosalpingogram, 2020 3:16 PM     CLINICAL HISTORY: Infertility, female         COMPARISON: Ultrasound 2020     PROCEDURE " COMMENT:  Catheter was placed by Dr Shea Moore  Contrast: 10 mL Isovue 300   Fluoroscopy time: 0.8 minutes.     FINDINGS:   The uterus is normal in appearance. Prompt of the left fallopian tube  with normal spillage into the peritoneal cavity. Delayed right  fallopian tube opacification with normal spillage into the peritoneal  cavity.                                                                    IMPRESSION:   Normal Hysterosalpingogram.     ASSESSMENT  Noemi Medrano is a 41 year old  who is here for evaluation of recurrent pregnancy loss.    PLAN  -we reviewed the evaluation of RPL, which includes an evaluation of the uterine cavity with HSG or 3D US. Taylor declined a repeat evaluation as she had one done in November of last year which was normal.  -I reviewed her day 3 testing as above, which did indicate a diminished ovarian reserve. Taylor didn't wish to repeat this testing as her menses and ovulation continue to be regular. We discussed that she may have a more difficult time with IVF if she were to chose this given the low AMH.   -Finally, we discussed parental karyotyping and testing for APLS. She would like to move forward with this. I have ordered both. We discussed that APLS testing typically is done twice to ensure a negative result. We discussed that the cost of karyotyping can be expensive even with insurance; I encouraged her to contact her insurance company and gave her the diagnosis codes so that she will know her expected cost prior to having it performed.  -Taylor asked me to order this test for her , Carlos Reyes, as well, which I have done. We also discussed that a semen analysis would be beneficial for him to complete.     Return after trip for ongoing evaluation/treatment vs referral to an [facility] specialist if Taylor desires.    Roberta Johnson MD, MSCI    [facility]

## 2021-12-01 ENCOUNTER — OFFICE VISIT (OUTPATIENT)
Dept: OBGYN | Facility: CLINIC | Age: 41
End: 2021-12-01
Attending: OBSTETRICS & GYNECOLOGY
Payer: COMMERCIAL

## 2021-12-01 VITALS
HEART RATE: 71 BPM | WEIGHT: 122.1 LBS | BODY MASS INDEX: 24.61 KG/M2 | HEIGHT: 59 IN | DIASTOLIC BLOOD PRESSURE: 72 MMHG | SYSTOLIC BLOOD PRESSURE: 106 MMHG

## 2021-12-01 DIAGNOSIS — N97.9 FEMALE INFERTILITY: ICD-10-CM

## 2021-12-01 DIAGNOSIS — N96 RECURRENT PREGNANCY LOSS: Primary | ICD-10-CM

## 2021-12-01 LAB
APTT PPP: 29 SECONDS (ref 22–38)
INR PPP: 0.96 (ref 0.86–1.14)

## 2021-12-01 PROCEDURE — 85390 FIBRINOLYSINS SCREEN I&R: CPT | Mod: 26 | Performed by: PATHOLOGY

## 2021-12-01 PROCEDURE — 88264 CHROMOSOME ANALYSIS 20-25: CPT | Performed by: OBSTETRICS & GYNECOLOGY

## 2021-12-01 PROCEDURE — 85613 RUSSELL VIPER VENOM DILUTED: CPT | Performed by: OBSTETRICS & GYNECOLOGY

## 2021-12-01 PROCEDURE — G0463 HOSPITAL OUTPT CLINIC VISIT: HCPCS

## 2021-12-01 PROCEDURE — 85730 THROMBOPLASTIN TIME PARTIAL: CPT | Mod: 91 | Performed by: OBSTETRICS & GYNECOLOGY

## 2021-12-01 PROCEDURE — 99213 OFFICE O/P EST LOW 20 MIN: CPT | Performed by: OBSTETRICS & GYNECOLOGY

## 2021-12-01 PROCEDURE — 88291 CYTO/MOLECULAR REPORT: CPT | Performed by: MEDICAL GENETICS

## 2021-12-01 PROCEDURE — 85610 PROTHROMBIN TIME: CPT | Performed by: OBSTETRICS & GYNECOLOGY

## 2021-12-01 PROCEDURE — 88289 CHROMOSOME STUDY ADDITIONAL: CPT | Performed by: OBSTETRICS & GYNECOLOGY

## 2021-12-01 PROCEDURE — 36415 COLL VENOUS BLD VENIPUNCTURE: CPT | Performed by: OBSTETRICS & GYNECOLOGY

## 2021-12-01 PROCEDURE — 88230 TISSUE CULTURE LYMPHOCYTE: CPT | Performed by: OBSTETRICS & GYNECOLOGY

## 2021-12-01 ASSESSMENT — MIFFLIN-ST. JEOR: SCORE: 1121.61

## 2021-12-01 ASSESSMENT — ANXIETY QUESTIONNAIRES
3. WORRYING TOO MUCH ABOUT DIFFERENT THINGS: NOT AT ALL
6. BECOMING EASILY ANNOYED OR IRRITABLE: NOT AT ALL
7. FEELING AFRAID AS IF SOMETHING AWFUL MIGHT HAPPEN: NOT AT ALL
5. BEING SO RESTLESS THAT IT IS HARD TO SIT STILL: NOT AT ALL
1. FEELING NERVOUS, ANXIOUS, OR ON EDGE: SEVERAL DAYS
2. NOT BEING ABLE TO STOP OR CONTROL WORRYING: NOT AT ALL
GAD7 TOTAL SCORE: 2

## 2021-12-01 ASSESSMENT — PATIENT HEALTH QUESTIONNAIRE - PHQ9: 5. POOR APPETITE OR OVEREATING: SEVERAL DAYS

## 2021-12-01 NOTE — PATIENT INSTRUCTIONS
Diagnosis codes  Primary diagnosis Recurrent pregnancy loss N96  Secondary diagnosis Female infertility N97.9

## 2021-12-01 NOTE — LETTER
"2021       RE: Noemi Medrano  [address]  [facility] [address]     Dear Colleague,    Thank you for referring your patient, Noemi Medrano, to the [facility] [place] [facility]. Please see a copy of my visit note below.    [facility]  Gynecology Problem Visit    SUBJECTIVE    Noemi Medrano is a 41 year old  who is here for evaluation of recurrent pregnancy loss. Earlier this year, Taylor had a 2nd miscarriage. She last saw Dr. Nelson in April, and the evaluation of RPL was reviewed at that time. Taylor is now ready to move forward with testing.     Taylor does note that recently, she visited a provider for dizziness, and was diagnosed with thyroid disease. She has an appointment with an endocrinologist soon.    Taylor notes her periods continue to be regular/monthly and her ovulation continues to be consistent.     Taylor and her  leave for a research trip to [place] in 2 weeks and will be there for 6 months.    Her LMP is: No LMP recorded. (Menstrual status: Missed (s)).    PAST MEDICAL HISTORY  No past medical history on file.    MEDICATIONS  No current outpatient medications on file.     No current facility-administered medications for this visit.       ALLERGIES  Allergies   Allergen Reactions     Sulfa Drugs Rash       REVIEW OF SYSTEMS  A 10 point review of systems including Constitutional, Eyes, Respiratory, Cardiovascular, Gastroenterology, Genitourinary, Integumentary, Musculoskeletal, and Psychiatric, were all negative, except for pertinent positives noted in the above HPI.    OBJECTIVE  /72   Pulse 71   Ht 1.51 m (4' 11.45\")   Wt 55.4 kg (122 lb 1.6 oz)   LMP 2021   BMI 24.29 kg/m      General: Alert, without distress   HEENT: normocephalic, without obvious abnormality   Examination deferred today     Component      Latest Ref Rng & Units 3/9/2020 "   FSH      IU/L 16.7   Estradiol      pg/mL 33   Anti-Mullerian Hormone      <=6.282 ng/mL 0.412   TSH      0.40 - 4.00 mU/L 3.57     Hysterosalpingogram  EXAMINATION: Hysterosalpingogram, 2020 3:16 PM     CLINICAL HISTORY: Infertility, female         COMPARISON: Ultrasound 2020     PROCEDURE COMMENT:  Catheter was placed by Dr Shea Moore  Contrast: 10 mL Isovue 300   Fluoroscopy time: 0.8 minutes.     FINDINGS:   The uterus is normal in appearance. Prompt of the left fallopian tube  with normal spillage into the peritoneal cavity. Delayed right  fallopian tube opacification with normal spillage into the peritoneal  cavity.                                                                    IMPRESSION:   Normal Hysterosalpingogram.     ASSESSMENT  Noemi Medrano is a 41 year old  who is here for evaluation of recurrent pregnancy loss.    PLAN  -we reviewed the evaluation of RPL, which includes an evaluation of the uterine cavity with HSG or 3D US. Taylor declined a repeat evaluation as she had one done in November of last year which was normal.  -I reviewed her day 3 testing as above, which did indicate a diminished ovarian reserve. Taylor didn't wish to repeat this testing as her menses and ovulation continue to be regular. We discussed that she may have a more difficult time with IVF if she were to chose this given the low AMH.   -Finally, we discussed parental karyotyping and testing for APLS. She would like to move forward with this. I have ordered both. We discussed that APLS testing typically is done twice to ensure a negative result. We discussed that the cost of karyotyping can be expensive even with insurance; I encouraged her to contact her insurance company and gave her the diagnosis codes so that she will know her expected cost prior to having it performed.  -Taylor asked me to order this test for her , Carlos Reyes, as well, which I have done. We also discussed that a semen  analysis would be beneficial for him to complete.     Return after trip for ongoing evaluation/treatment vs referral to an [facility] specialist if Taylor desires.    Roberta Johnson MD, MSCI    [facility]

## 2021-12-02 ENCOUNTER — MYC MEDICAL ADVICE (OUTPATIENT)
Dept: OBGYN | Facility: CLINIC | Age: 41
End: 2021-12-02
Payer: COMMERCIAL

## 2021-12-02 LAB
DRVVT SCREEN RATIO: 0.87
LA PPP-IMP: NEGATIVE
LUPUS INTERPRETATION: NORMAL
PTT RATIO: 1
THROMBIN TIME: 15.9 SECONDS (ref 13–19)

## 2021-12-02 ASSESSMENT — ANXIETY QUESTIONNAIRES: GAD7 TOTAL SCORE: 2

## 2021-12-02 ASSESSMENT — PATIENT HEALTH QUESTIONNAIRE - PHQ9: SUM OF ALL RESPONSES TO PHQ QUESTIONS 1-9: 4

## 2021-12-02 NOTE — TELEPHONE ENCOUNTER
Sent my chart message with CPT codes to pt.  Reached out to Anh in financial and she is unable to find out if labs are covered.

## 2021-12-02 NOTE — TELEPHONE ENCOUNTER
----- Message from Rani Alexis RN sent at 12/2/2021 10:12 AM CST -----  Regarding: FW: karyotype testing  Sent an email to Anh  ----- Message -----  From: Roberta Johnson MD  Sent: 12/1/2021   5:08 PM CST  To: Harper Ott CMA, [facility]  Subject: karyotype testing                                Hi team,    Ms. Medrano came to see me today for evaluation of recurrent pregnancy loss. She would like all testing including karyotyping, which I have ordered. Is it possible to see if this test is covered by her insurance with financial services? I have also encouraged her to contact her insurance company.    Additionally, I have ordered the karyotype for her  (MR [id number]), but he wasn't present today to sign genetics consent. He should call the office tomorrow to give permission. I have filled out the consent form - it would just need two signatures from you all to confirm he gave consent over the phone (It's on your desk/computer, Margo!). I hope that's OK.    Thank you,    Roberta Johnson

## 2021-12-03 ENCOUNTER — TELEPHONE (OUTPATIENT)
Dept: OBGYN | Facility: CLINIC | Age: 41
End: 2021-12-03
Payer: COMMERCIAL

## 2021-12-03 DIAGNOSIS — N96 RECURRENT PREGNANCY LOSS: Primary | ICD-10-CM

## 2021-12-03 NOTE — TELEPHONE ENCOUNTER
----- Message from Roberta Johnson MD sent at 12/3/2021  9:27 AM CST -----  Regarding: FW: cancel tests  Hi nursing team,    Apparently lab didn't draw the labs I ordered and just canceled them. Could you call Taylor today and let her know? I have ordered them again.     Thank you,    JR  ----- Message -----  From: Medhat Jaimes  Sent: 12/2/2021  12:03 PM CST  To: Roberta Johnson MD  Subject: cancel tests                                     Specimen was not collected for Beta 2 Glycoprotein antibody IgG, IgM, and Cardiolipin antibody IgM.  These tests has been canceled.  Thank You.

## 2021-12-06 ENCOUNTER — LAB (OUTPATIENT)
Dept: LAB | Facility: CLINIC | Age: 41
End: 2021-12-06
Payer: COMMERCIAL

## 2021-12-06 DIAGNOSIS — N96 RECURRENT PREGNANCY LOSS: ICD-10-CM

## 2021-12-06 DIAGNOSIS — E03.9 HYPOTHYROIDISM, UNSPECIFIED TYPE: ICD-10-CM

## 2021-12-06 DIAGNOSIS — N96 HISTORY OF RECURRENT MISCARRIAGES, NOT CURRENTLY PREGNANT: ICD-10-CM

## 2021-12-06 DIAGNOSIS — R19.8 GI SYMPTOMS: ICD-10-CM

## 2021-12-06 PROCEDURE — 36415 COLL VENOUS BLD VENIPUNCTURE: CPT

## 2021-12-06 PROCEDURE — 86376 MICROSOMAL ANTIBODY EACH: CPT

## 2021-12-06 PROCEDURE — 86147 CARDIOLIPIN ANTIBODY EA IG: CPT

## 2021-12-06 PROCEDURE — 86146 BETA-2 GLYCOPROTEIN ANTIBODY: CPT

## 2021-12-06 PROCEDURE — 84443 ASSAY THYROID STIM HORMONE: CPT

## 2021-12-06 PROCEDURE — 83516 IMMUNOASSAY NONANTIBODY: CPT

## 2021-12-07 LAB
B2 GLYCOPROT1 IGG SERPL IA-ACNC: 1 U/ML
B2 GLYCOPROT1 IGM SERPL IA-ACNC: 2.7 U/ML
CARDIOLIPIN IGG SER IA-ACNC: <2 GPL-U/ML
CARDIOLIPIN IGG SER IA-ACNC: NEGATIVE
CARDIOLIPIN IGM SER IA-ACNC: <2 MPL-U/ML
CARDIOLIPIN IGM SER IA-ACNC: NEGATIVE

## 2021-12-08 ENCOUNTER — PRE VISIT (OUTPATIENT)
Dept: ENDOCRINOLOGY | Facility: CLINIC | Age: 41
End: 2021-12-08

## 2021-12-08 ENCOUNTER — VIRTUAL VISIT (OUTPATIENT)
Dept: ENDOCRINOLOGY | Facility: CLINIC | Age: 41
End: 2021-12-08
Attending: FAMILY MEDICINE
Payer: COMMERCIAL

## 2021-12-08 DIAGNOSIS — N96 HISTORY OF RECURRENT MISCARRIAGES, NOT CURRENTLY PREGNANT: Primary | ICD-10-CM

## 2021-12-08 DIAGNOSIS — Z31.69 ENCOUNTER FOR PRECONCEPTION CONSULTATION: ICD-10-CM

## 2021-12-08 DIAGNOSIS — R19.8 GI SYMPTOMS: ICD-10-CM

## 2021-12-08 DIAGNOSIS — E03.9 HYPOTHYROIDISM, UNSPECIFIED TYPE: ICD-10-CM

## 2021-12-08 LAB — TSH SERPL DL<=0.005 MIU/L-ACNC: 3 MU/L (ref 0.4–4)

## 2021-12-08 PROCEDURE — 99204 OFFICE O/P NEW MOD 45 MIN: CPT | Mod: 95 | Performed by: INTERNAL MEDICINE

## 2021-12-08 ASSESSMENT — ENCOUNTER SYMPTOMS
HOT FLASHES: 0
PALPITATIONS: 1
SLEEP DISTURBANCES DUE TO BREATHING: 0
BLOOD IN STOOL: 0
SINUS PAIN: 0
TROUBLE SWALLOWING: 0
SMELL DISTURBANCE: 0
ORTHOPNEA: 0
NAUSEA: 0
ABDOMINAL PAIN: 0
JAUNDICE: 0
NAIL CHANGES: 1
DIARRHEA: 0
SORE THROAT: 0
HEARTBURN: 0
PANIC: 0
CONSTIPATION: 1
LEG PAIN: 0
NECK MASS: 0
POOR WOUND HEALING: 0
DEPRESSION: 0
VOMITING: 0
RECTAL PAIN: 0
HYPOTENSION: 1
SKIN CHANGES: 0
INSOMNIA: 1
NERVOUS/ANXIOUS: 1
TASTE DISTURBANCE: 0
DECREASED CONCENTRATION: 0
HOARSE VOICE: 0
BOWEL INCONTINENCE: 0
SINUS CONGESTION: 1
BLOATING: 1
HYPERTENSION: 0
EXERCISE INTOLERANCE: 0
DECREASED LIBIDO: 1
SYNCOPE: 0
LIGHT-HEADEDNESS: 1

## 2021-12-08 NOTE — PROGRESS NOTES
Endocrinology virtual Visit    Chief Complaint: Thyroid Problem     Information obtained from:Patient      Assessment/Treatment Plan:      Recurrent miscarriage in 2020 and 2021  Preconception counseling   Subclinical hypothyroidism      Last TFT showed subclinical hypothyroidism with TSH above 5. Check TPO antibody; added to blood sample on hand. If positive; there is some data to support the use of low dose levothyroxine even in the setting of high normal TSH in a patient with recurrent  Miscarriage and who is currently trying to get pregnant. Risk-benefit discussed. Noted TSH. TPO pending. Pt will leave the country in a couple of days until May 2021; therefore prescription for levothyroxine was provided today. Check TFT overseas in 2-3 months.     GI symptoms-bloating and constipation - GI symptoms concerning for possible celiac disease therefore tissue glutaminase (TTG) added to the blood sample. Please follow up with PCP regarding GI issues.       Return to clinic in 6 months.    Test and/or medications prescribed today:  Orders Placed This Encounter   Procedures     Thyroid peroxidase antibody     TSH with free T4 reflex     Tissue transglutaminase rosemary IgA and IgG         Milton Quijano MD  Staff Endocrinologist    Division of Endocrinology and Diabetes      Subjective:         HPI: Noemi Medrano is a 41 year old female with history of 2 episodes of miscarriage and subclinical hypothyroidism who is seen in consultation at Julee Knight's request for the same.    Patient has had miscarriage first in February 2020 and then following that in March 2021.  As part of work-up for her symptoms including miscarriage had a thyroid function test checked.  She was noted to have TSH of 5.32 with a free T4 of 1.2 in October 2021.  She reports that she has had similar blood work about a year ago which normalized on the follow-up testing.  She has family history of hypothyroidism in her mom.  She reports constipation,  cold intolerance, hair loss since March 2021 and her anxiety symptoms.  She has GI symptoms particularly constipation and bloating which has been persistent since her colon surgery years ago.    Detailed review of system documented below.  Allergies   Allergen Reactions     Sulfa Drugs Rash       Current Outpatient Medications   Medication Sig Dispense Refill     Prenatal Vit-Fe Fumarate-FA (PRENATAL VITAMIN PO)      Vitamin D, Magnesium, Selenium      Review of Systems     11 point review system (Constitutional, HENT, Eyes, Respiratory, Cardiovascular, Gastrointestinal, Genitourinary, Musculoskeletal,Neurological, Psychiatric/Behavioural, Endocrine) is negative or is as per HPI above  Answers for HPI/ROS submitted by the patient on 12/8/2021  General Symptoms: No  Skin Symptoms: Yes  HENT Symptoms: Yes  EYE SYMPTOMS: No  HEART SYMPTOMS: Yes  LUNG SYMPTOMS: No  INTESTINAL SYMPTOMS: Yes  URINARY SYMPTOMS: No  GYNECOLOGIC SYMPTOMS: Yes  BREAST SYMPTOMS: No  SKELETAL SYMPTOMS: No  BLOOD SYMPTOMS: No  NERVOUS SYSTEM SYMPTOMS: No  MENTAL HEALTH SYMPTOMS: Yes  Tinnitus: Yes  Congestion: Yes  Sinus pain: No  Trouble swallowing: No   Voice hoarseness: No  Mouth sores: No  Sore throat: No  Tooth pain: No  Gum tenderness: No  Bleeding gums: No  Change in taste: No  Change in sense of smell: No  Dry mouth: No  Hearing aid used: No  Neck lump: No  Changes in hair: Yes  Changes in moles/birth marks: No  Itching: No  Rashes: No  Changes in nails: Yes  Acne: No  Hair in places you don't want it: No  Change in facial hair: No  Warts: No  Non-healing sores: No  Scarring: No  Flaking of skin: Yes  Color changes of hands/feet in cold : No  Sun sensitivity: No  Skin thickening: No  Chest pain or pressure: No  Fast or irregular heartbeat: Yes  Pain in legs with walking: No  Trouble breathing while lying down: No  Fingers or toes appear blue: No  High blood pressure: No  Low blood pressure: Yes  Fainting: No  Murmurs: No  Pacemaker:  No  Varicose veins: No  Edema or swelling: No  Wake up at night with shortness of breath: No  Light-headedness: Yes  Exercise intolerance: No  Heart burn or indigestion: No  Nausea: No  Vomiting: No  Abdominal pain: No  Bloating: Yes  Constipation: Yes  Diarrhea: No  Blood in stool: No  Black stools: No  Rectal or Anal pain: No  Fecal incontinence: No  Yellowing of skin or eyes: No  Vomit with blood: No  Change in stools: No  Bleeding or spotting between periods: No  Heavy or painful periods: No  Irregular periods: No  Vaginal discharge: No  Hot flashes: No  Vaginal dryness: No  Genital ulcers: No  Reduced libido: Yes  Painful intercourse: No  Difficulty with sexual arousal: Yes  Post-menopausal bleeding: No  Nervous or Anxious: Yes  Depression: No  Trouble sleeping: Yes  Trouble thinking or concentrating: No  Mood changes: No  Panic attacks: No      Past Surgical History:   Procedure Laterality Date     ABDOMEN SURGERY  1997 - 98 aprox.    Right hemicolectomy     APPENDECTOMY  1996     COLON SURGERY  1996     COLONOSCOPY  Aprox. 1 year ago       Family History   Problem Relation Age of Onset     Diabetes Other      Coronary Artery Disease Other      Hypertension Mother      Osteoporosis Mother      Thyroid Disease Mother      Hypertension Maternal Grandmother    Non-smoker. .       Objective:     END[facility] VITALS-[facility] 12/1/2021   Weight 55.384 kg   Weight 122 lb 1.6 oz   Height 59 in   /72   Pulse 71   Resp    BSA (Calculated - sq m) 1.52   BMI (Calculated) 24.29     Wt Readings from Last 10 Encounters:   12/01/21 55.4 kg (122 lb 1.6 oz)   04/14/21 56.7 kg (125 lb)   03/30/21 56.7 kg (125 lb)   02/25/20 55.8 kg (123 lb)   04/10/19 53.5 kg (118 lb)   02/25/19 55.3 kg (122 lb)   GENERAL: Healthy, alert and no distress  EYES: Eyes grossly normal to inspection.  No discharge or erythema, or obvious scleral/conjunctival abnormalities.  RESP: No audible wheeze, cough, or visible cyanosis.    SKIN: Visible skin  clear.  NEURO: alert and oriented.  Mentation and speech appropriate for age.  PSYCH: Mentation appears normal, affect normal/bright, judgement and insight intact, normal speech and appearance well-groomed.    In House Labs:     TSH   Date Value Ref Range Status   12/06/2021 3.00 0.40 - 4.00 mU/L Final   03/09/2020 3.57 0.40 - 4.00 mU/L Final   07/24/2019 2.59 0.40 - 4.00 mU/L Final               This note has been dictated using voice recognition software.  As a result, there may be errors in the documentation that have gone undetected.  Please consider this when interpreting information in this documentation.        Video-Visit Details    Type of service:  Video Visit  All times are in your local time  1st VideoStart: 12/08/2021 11:11 am  Stop: 12/08/2021 11:40 am   Originating Location (pt. Location): Home  Platform used for Video Visit: [employer]

## 2021-12-08 NOTE — PROGRESS NOTES
Taylor is a 41 year old who is being evaluated via a billable video visit.      How would you like to obtain your AVS? MyChart  If the video visit is dropped, the invitation should be resent by: Send to e-mail at: [email or web address]  Will anyone else be joining your video visit? No

## 2021-12-08 NOTE — PATIENT INSTRUCTIONS
Taylor,     We have added the following blood work to the blood sample already collected on 12/6/2021.  We will contact you after the results.  Orders Placed This Encounter   Procedures     Thyroid peroxidase antibody     TSH with free T4 reflex     Tissue transglutaminase rosemary IgA and IgG

## 2021-12-09 ENCOUNTER — MYC MEDICAL ADVICE (OUTPATIENT)
Dept: ENDOCRINOLOGY | Facility: CLINIC | Age: 41
End: 2021-12-09
Payer: COMMERCIAL

## 2021-12-09 LAB
THYROPEROXIDASE AB SERPL-ACNC: 29 IU/ML
TTG IGA SER-ACNC: 0.6 U/ML
TTG IGG SER-ACNC: <0.6 U/ML

## 2021-12-09 RX ORDER — LEVOTHYROXINE SODIUM 25 UG/1
25 TABLET ORAL DAILY
Qty: 90 TABLET | Refills: 1 | Status: SHIPPED | OUTPATIENT
Start: 2021-12-09

## 2021-12-09 NOTE — RESULT ENCOUNTER NOTE
Taylor,     As we have discussed, I have sent a prescription for levothyroxine to your pharmacy. Take levothyroxine 25 mcg daily. Levothyroxine should be taken on empty stomach and wait at least 30 minutes before eating. Don't take supplements or multivitamins containing iron and calcium within 4 hours of taking levothyroxine tablet.   Please check follow up blood work in two months.   We will contact you once we have the other pending results.   Please let me know if any questions,     Milton Quijano MD

## 2021-12-09 NOTE — RESULT ENCOUNTER NOTE
Taylor,      The screening for celiac disease and hashimoto thyroiditis antibody test results have come back within the normal limits. Given your plan for pregnancy in the near future and previous history of miscarriages; considering levothyroxine   as we have discussed previously seems reasonable.  I have sent a prescription for levothyroxine to your pharmacy. Take levothyroxine 25 mcg daily. Levothyroxine should be taken on empty stomach and wait at least 30 minutes before eating. Don't take supplements or multivitamins containing iron and calcium within 4 hours of taking levothyroxine tablet.   Please check follow up blood work in two months.      Milton Quijano MD

## 2021-12-17 LAB
CULTURE HARVEST COMPLETE DATE: NORMAL

## 2021-12-20 LAB
ADDITIONAL COMMENTS: NORMAL
INTERPRETATION: NORMAL
ISCN: NORMAL
METHODS: NORMAL

## 2022-04-26 NOTE — TELEPHONE ENCOUNTER
[facility] Call Center    Phone Message    May a detailed message be left on voicemail: yes     Reason for Call: Other: Patient is being referred to be seen for Autoimmune thyroiditis. Ref by Dr Knight at [place]. Patient is currently scheduled on 12/8/21 at 11:00am with Dr Quijano. Appointment is outside of the 2 week time frame. Sending encounter to clinic for review. Please call patient to schedule if sooner opening is needed. Of note, patient was hoping to be able to be seen in November due to traveling to [place] for work starting in December.     Action Taken: Message routed to:  [facility]): Endocrinology    Travel Screening: Not Applicable                                                                       Azathioprine Counseling:  I discussed with the patient the risks of azathioprine including but not limited to myelosuppression, immunosuppression, hepatotoxicity, lymphoma, and infections.  The patient understands that monitoring is required including baseline LFTs, Creatinine, possible TPMP genotyping and weekly CBCs for the first month and then every 2 weeks thereafter.  The patient verbalized understanding of the proper use and possible adverse effects of azathioprine.  All of the patient's questions and concerns were addressed.

## 2022-05-21 ENCOUNTER — HEALTH MAINTENANCE LETTER (OUTPATIENT)
Age: 42
End: 2022-05-21

## 2022-06-09 ENCOUNTER — TELEPHONE (OUTPATIENT)
Dept: ENDOCRINOLOGY | Facility: CLINIC | Age: 42
End: 2022-06-09
Payer: COMMERCIAL

## 2022-06-09 NOTE — TELEPHONE ENCOUNTER
Attempted to reach patient to schedule follow up in the Endocrinology Clinic. No answer, LM on VM to call office back.    Schedule lab appointment per Dr. Quijano. Cora Harmon on 6/9/2022 at 2:33 PM

## 2022-09-18 ENCOUNTER — HEALTH MAINTENANCE LETTER (OUTPATIENT)
Age: 42
End: 2022-09-18

## 2022-10-15 ENCOUNTER — MYC MEDICAL ADVICE (OUTPATIENT)
Dept: ENDOCRINOLOGY | Facility: CLINIC | Age: 42
End: 2022-10-15

## 2022-10-17 NOTE — TELEPHONE ENCOUNTER
Dr. Quijano,    Please advise when able. I was not able to locate any regular or LARRY openings in your schedule before November 15.     Thank you,  Juan

## 2022-11-07 NOTE — PROGRESS NOTES
[facility]  Gynecology Consult Visit    SUBJECTIVE    Noemi Medrano is a 42 year old  who is here for AUB and breast lumps.      Patient reports since 2022 she has had irregular periods. Her cycle length has varied from 14 days to 3 months. Her last episode of bleeding started 10/17 and lasted for 6 days. Patient reports she was seen at [facility] recently and had her thyroid function checked, which was reportedly normal. Patient previously had regular 26-28 day menstrual cycles. She reports her month and sisters had onset of menopause around age 52. She denies any hot flashes, night sweats, vaginal dryness. She denies any abdominal pain or abnormal vaginal discharge.     Patient also reports that a few months ago, she noticed that her breasts felt painful, swollen, and hard. This lasted for a couple months, and has since resolved. Since then, she has been noticed lumps in both breasts near the areola. She denies any skin changes or nipple discharge. Patient had a mammogram ~10 years ago which was reportedly normal. No personal or family history of breast cancer.    Patient also shares that she is interested in future fertility. She recently  from her , but she is interested in discussing the odds of conceiving with a new partner or with the help of a fertility clinic.    She is returning to [place] in December.    Her LMP is: Patient's last menstrual period was 10/17/2022.    PAST MEDICAL HISTORY  No past medical history on file.    MEDICATIONS  Current Outpatient Medications   Medication     levothyroxine (SYNTHROID/LEVOTHROID) 25 MCG tablet     No current facility-administered medications for this visit.     ALLERGIES  Allergies   Allergen Reactions     Sulfa Drugs Rash     OB History    Para Term  AB Living   2 0 0 0 2 0   SAB IAB Ectopic Multiple Live Births   2 0 0 0 0      # Outcome Date GA Lbr Homero/2nd Weight Sex Delivery Anes PTL Lv   2 SAB      SAB    "   1 SAB      SAB        PAST SURGICAL HISTORY   Past Surgical History:   Procedure Laterality Date     ABDOMEN SURGERY  1997 - 98 aprox.    Right hemicolectomy     APPENDECTOMY  1996     COLON SURGERY  1996     COLONOSCOPY  Aprox. 1 year ago     SOCIAL HISTORY    Social History     Tobacco Use     Smoking status: Former     Packs/day: 0.25     Years: 0.00     Pack years: 0.00     Types: Cigarettes     Start date: 1/1/1998     Smokeless tobacco: Never   Substance Use Topics     Alcohol use: Not Currently     Alcohol/week: 2.0 - 4.0 standard drinks     Drug use: No     FAMILY HISTORY    Family History   Problem Relation Age of Onset     Diabetes Other      Coronary Artery Disease Other      Hypertension Mother      Osteoporosis Mother      Thyroid Disease Mother      Hypertension Maternal Grandmother      REVIEW OF SYSTEMS  A 10 point review of systems including Constitutional, Eyes, Respiratory, Cardiovascular, Gastroenterology, Genitourinary, Integumentary, Musculoskeletal, and Psychiatric, were all negative, except for pertinent positives noted in the above HPI.    OBJECTIVE  /63   Pulse 73   Ht 1.51 m (4' 11.45\")   Wt 56.2 kg (123 lb 12.8 oz)   LMP 10/17/2022   BMI 24.63 kg/m      General: alert, well appearing, NAD  HEENT: normocephalic, without obvious abnormality  Breast: fibrocystic changes bilaterally, no skin dimpling, no nipple discharge   Cardiovascular: regular rate, well perfused   Lungs: normal work of breathing   Extremities: normal     Latest Reference Range & Units 03/09/20 12:25   Estradiol pg/mL 33   FSH IU/L 16.7   TSH 0.40 - 4.00 mU/L 3.57   Anti-Mullerian Hormone <=6.282 ng/mL 0.412     IMAGING:  Pelvic Ultrasound (01/07/2021)  Uterine findings:              Presence: Visible Size: Normal 4.3 x 5.3 x 3.1 cm.  Endometrium = 4.6 mm.              Cx length = 26.6 mm.                            Flexion:  Retroverted  Position: Midline          Margins: Smooth         Shape: " Normal              Contour: Regular        Texture: Homogeneous          Cavity: Normal            Masses: Abnormal  Left anterior myoma- 1.1 x 1.3 x 0.9cm     Pelvic findings:               Right Adnexa: Normal              Left Adnexa: Normal              Bladder:  Normal                                                           Cul - de - sac fluid: None     Ovarian follicles:              Right ovary:  2.2 x 1.8 x 1.7cm. Multiple follicles. Size(s):  <1cm    Left ovary:  1.6 x 1.8 x 1.8cm. 1 follicle- 1.2 x 1.0 x 0.9cm. Hyperechoic area:  0.4 x 0.4 x 0.7cm      Comments:  Normal appearing uterus and endometrial stripe, left ovary with small hyperechoic area of uncertain significance-possible normal stroma.  Further studies as clinically indicated.      ASSESSMENT  Noemi Medrano is a 42 year old  who is here with AUB, with almost one year of irregular cycles. Differential diagnosis includes structural etiology such as polyp or fibroid, thyroid dysfunction, perimenopausal hormone fluctuations. Had normal TSH recently at [place]. Plan to obtain pelvic US, as last imaging was almost 2 years ago. Patient is also interested in future fertility, so will recheck labs including FSH, estradiol, AMH on cycle day 3. Patient also with concerns for breast lumps today. Breast exam today with fibrocystic changes; given patient's age would also recommend routine screening mammogram.    PLAN     - Transvaginal US  - Day 3 FSH, estradiol, AMH for irregular menses/infertility  - Screening mammogram  - Return to clinic to discuss lab and US results    Betty Nguyen MD  OB/GYN PGY-1    OBGYN Attending Addendum     I, Roberta Johnson, saw Noemi Medrano with the resident, Dr. Nguyen, and agree with their findings and plan of care as documented in the above note.    I personally reviewed vitals, meds, labs, exam.     Key findings:  with a history of recurrent pregnancy loss with new AUB as well as new breast concerns. Breast  exam reassuring today.     I agree with the plan for day 3 FSH/E2/AMH, pelvic ultrasound, and mammogram.    Roberta Johnson MD, MSCI  Date of Service: 11/8/2022

## 2022-11-08 ENCOUNTER — OFFICE VISIT (OUTPATIENT)
Dept: OBGYN | Facility: CLINIC | Age: 42
End: 2022-11-08
Attending: OBSTETRICS & GYNECOLOGY
Payer: COMMERCIAL

## 2022-11-08 VITALS
WEIGHT: 123.8 LBS | DIASTOLIC BLOOD PRESSURE: 63 MMHG | BODY MASS INDEX: 24.96 KG/M2 | HEART RATE: 73 BPM | HEIGHT: 59 IN | SYSTOLIC BLOOD PRESSURE: 100 MMHG

## 2022-11-08 DIAGNOSIS — N97.9 FEMALE INFERTILITY: ICD-10-CM

## 2022-11-08 DIAGNOSIS — N93.9 ABNORMAL UTERINE BLEEDING: Primary | ICD-10-CM

## 2022-11-08 DIAGNOSIS — Z12.31 ENCOUNTER FOR SCREENING MAMMOGRAM FOR BREAST CANCER: ICD-10-CM

## 2022-11-08 PROCEDURE — G0463 HOSPITAL OUTPT CLINIC VISIT: HCPCS

## 2022-11-08 PROCEDURE — 99213 OFFICE O/P EST LOW 20 MIN: CPT | Mod: GC | Performed by: OBSTETRICS & GYNECOLOGY

## 2022-11-08 NOTE — PATIENT INSTRUCTIONS
You may schedule a mammogram and an ultrasound on your way out today.    Please have your lab work done on the 3rd, 4th, or 5th day of your period.     Please return to discuss these results and next steps.

## 2022-11-08 NOTE — LETTER
2022       RE: Noemi Medrano  [address]  [place] [address]     Dear Colleague,    Thank you for referring your patient, Noemi Medrano, to the [facility] [place] at [facility]. Please see a copy of my visit note below.    [facility]  Gynecology Consult Visit    SUBJECTIVE    Noemi Medrano is a 42 year old  who is here for AUB and breast lumps.      Patient reports since 2022 she has had irregular periods. Her cycle length has varied from 14 days to 3 months. Her last episode of bleeding started 10/17 and lasted for 6 days. Patient reports she was seen at [facility] recently and had her thyroid function checked, which was reportedly normal. Patient previously had regular 26-28 day menstrual cycles. She reports her month and sisters had onset of menopause around age 52. She denies any hot flashes, night sweats, vaginal dryness. She denies any abdominal pain or abnormal vaginal discharge.     Patient also reports that a few months ago, she noticed that her breasts felt painful, swollen, and hard. This lasted for a couple months, and has since resolved. Since then, she has been noticed lumps in both breasts near the areola. She denies any skin changes or nipple discharge. Patient had a mammogram ~10 years ago which was reportedly normal. No personal or family history of breast cancer.    Patient also shares that she is interested in future fertility. She recently  from her , but she is interested in discussing the odds of conceiving with a new partner or with the help of a fertility clinic.    She is returning to [place] in December.    Her LMP is: Patient's last menstrual period was 10/17/2022.    PAST MEDICAL HISTORY  No past medical history on file.    MEDICATIONS  Current Outpatient Medications   Medication     levothyroxine (SYNTHROID/LEVOTHROID) 25 MCG tablet     No current  "facility-administered medications for this visit.     ALLERGIES  Allergies   Allergen Reactions     Sulfa Drugs Rash     OB History    Para Term  AB Living   2 0 0 0 2 0   SAB IAB Ectopic Multiple Live Births   2 0 0 0 0      # Outcome Date GA Lbr Homero/2nd Weight Sex Delivery Anes PTL Lv   2 SAB      SAB      1 SAB      SAB        PAST SURGICAL HISTORY   Past Surgical History:   Procedure Laterality Date     ABDOMEN SURGERY  1997 aprox.    Right hemicolectomy     APPENDECTOMY       COLON SURGERY       COLONOSCOPY  Aprox. 1 year ago     SOCIAL HISTORY    Social History     Tobacco Use     Smoking status: Former     Packs/day: 0.25     Years: 0.00     Pack years: 0.00     Types: Cigarettes     Start date: 1998     Smokeless tobacco: Never   Substance Use Topics     Alcohol use: Not Currently     Alcohol/week: 2.0 - 4.0 standard drinks     Drug use: No     FAMILY HISTORY    Family History   Problem Relation Age of Onset     Diabetes Other      Coronary Artery Disease Other      Hypertension Mother      Osteoporosis Mother      Thyroid Disease Mother      Hypertension Maternal Grandmother      REVIEW OF SYSTEMS  A 10 point review of systems including Constitutional, Eyes, Respiratory, Cardiovascular, Gastroenterology, Genitourinary, Integumentary, Musculoskeletal, and Psychiatric, were all negative, except for pertinent positives noted in the above HPI.    OBJECTIVE  /63   Pulse 73   Ht 1.51 m (4' 11.45\")   Wt 56.2 kg (123 lb 12.8 oz)   LMP 10/17/2022   BMI 24.63 kg/m      General: alert, well appearing, NAD  HEENT: normocephalic, without obvious abnormality  Breast: fibrocystic changes bilaterally, no skin dimpling, no nipple discharge   Cardiovascular: regular rate, well perfused   Lungs: normal work of breathing   Extremities: normal     Latest Reference Range & Units 20 12:25   Estradiol pg/mL 33   FSH IU/L 16.7   TSH 0.40 - 4.00 mU/L 3.57   Anti-Mullerian Hormone " <=6.282 ng/mL 0.412     IMAGING:  Pelvic Ultrasound (2021)  Uterine findings:              Presence: Visible Size: Normal 4.3 x 5.3 x 3.1 cm.  Endometrium = 4.6 mm.              Cx length = 26.6 mm.                            Flexion:  Retroverted  Position: Midline          Margins: Smooth         Shape: Normal              Contour: Regular        Texture: Homogeneous          Cavity: Normal            Masses: Abnormal  Left anterior myoma- 1.1 x 1.3 x 0.9cm     Pelvic findings:               Right Adnexa: Normal              Left Adnexa: Normal              Bladder:  Normal                                                           Cul - de - sac fluid: None     Ovarian follicles:              Right ovary:  2.2 x 1.8 x 1.7cm. Multiple follicles. Size(s):  <1cm    Left ovary:  1.6 x 1.8 x 1.8cm. 1 follicle- 1.2 x 1.0 x 0.9cm. Hyperechoic area:  0.4 x 0.4 x 0.7cm      Comments:  Normal appearing uterus and endometrial stripe, left ovary with small hyperechoic area of uncertain significance-possible normal stroma.  Further studies as clinically indicated.      ASSESSMENT  Noemi Medrano is a 42 year old  who is here with AUB, with almost one year of irregular cycles. Differential diagnosis includes structural etiology such as polyp or fibroid, thyroid dysfunction, perimenopausal hormone fluctuations. Had normal TSH recently at [place]. Plan to obtain pelvic US, as last imaging was almost 2 years ago. Patient is also interested in future fertility, so will recheck labs including FSH, estradiol, AMH on cycle day 3. Patient also with concerns for breast lumps today. Breast exam today with fibrocystic changes; given patient's age would also recommend routine screening mammogram.    PLAN     - Transvaginal US  - Day 3 FSH, estradiol, AMH for irregular menses/infertility  - Screening mammogram  - Return to clinic to discuss lab and US results    Betty Nguyen MD  OB/GYN PGY-1    OBGYN Attending Addendum     I,  Roberta Johnson, saw Noemi Medrano with the resident, Dr. Nguyen, and agree with their findings and plan of care as documented in the above note.    I personally reviewed vitals, meds, labs, exam.     Key findings:  with a history of recurrent pregnancy loss with new AUB as well as new breast concerns. Breast exam reassuring today.     I agree with the plan for day 3 FSH/E2/AMH, pelvic ultrasound, and mammogram.    Roberta Johnson MD, MSCI  Date of Service: 2022

## 2022-11-09 ENCOUNTER — ANCILLARY PROCEDURE (OUTPATIENT)
Dept: ULTRASOUND IMAGING | Facility: CLINIC | Age: 42
End: 2022-11-09
Attending: OBSTETRICS & GYNECOLOGY
Payer: COMMERCIAL

## 2022-11-09 DIAGNOSIS — N97.9 FEMALE INFERTILITY: ICD-10-CM

## 2022-11-09 DIAGNOSIS — N93.9 ABNORMAL UTERINE BLEEDING: ICD-10-CM

## 2022-11-09 PROCEDURE — 76830 TRANSVAGINAL US NON-OB: CPT | Mod: 26 | Performed by: OBSTETRICS & GYNECOLOGY

## 2022-11-09 PROCEDURE — 76830 TRANSVAGINAL US NON-OB: CPT

## 2022-12-14 ENCOUNTER — VIRTUAL VISIT (OUTPATIENT)
Dept: OBGYN | Facility: CLINIC | Age: 42
End: 2022-12-14
Attending: OBSTETRICS & GYNECOLOGY
Payer: COMMERCIAL

## 2022-12-14 DIAGNOSIS — N97.9 FEMALE INFERTILITY: ICD-10-CM

## 2022-12-14 DIAGNOSIS — N93.9 ABNORMAL UTERINE BLEEDING (AUB): Primary | ICD-10-CM

## 2022-12-14 NOTE — LETTER
Date:December 19, 2022      Provider requested that no letter be sent. Do not send.       [facility]

## 2022-12-14 NOTE — LETTER
12/14/2022       RE: Noemi Medrano  [address]  [place] [address]     Dear Colleague,    Thank you for referring your patient, Noemi Medrano, to the [facility]. Please see a copy of my visit note below.    Contacted via phone several times without answer. No VM box available.     Roberta Johnson MD, MSCI, FACOG    [facility]/OBGYN  December 17, 2022      Again, thank you for allowing me to participate in the care of your patient.      Sincerely,    Roberta Johnson MD

## 2022-12-17 NOTE — PROGRESS NOTES
Contacted via phone several times without answer. No VM box available.     Roberta Johnson MD, MSCI, FACOG    [facility]/OB[facility]N  December 17, 2022

## 2023-06-04 ENCOUNTER — HEALTH MAINTENANCE LETTER (OUTPATIENT)
Age: 43
End: 2023-06-04

## 2023-07-30 ENCOUNTER — HEALTH MAINTENANCE LETTER (OUTPATIENT)
Age: 43
End: 2023-07-30

## 2024-07-14 ENCOUNTER — HEALTH MAINTENANCE LETTER (OUTPATIENT)
Age: 44
End: 2024-07-14

## 2024-10-17 NOTE — TELEPHONE ENCOUNTER
See my chart question.   Called patient and addressed questions.   Total telephone time = 10 minutes.    [Dear  ___] : Dear  [unfilled], [Consult Letter:] : I had the pleasure of evaluating your patient, [unfilled]. [Please see my note below.] : Please see my note below. [Consult Closing:] : Thank you very much for allowing me to participate in the care of this patient.  If you have any questions, please do not hesitate to contact me. [Sincerely,] : Sincerely, [FreeTextEntry2] : Janeth Ramirez MD [FreeTextEntry3] : Jeffry Fall MD Surgical Oncology [facility] Office: [phone] Cell: [phone]

## 2025-07-19 ENCOUNTER — HEALTH MAINTENANCE LETTER (OUTPATIENT)
Age: 45
End: 2025-07-19

## 2025-08-09 ENCOUNTER — HEALTH MAINTENANCE LETTER (OUTPATIENT)
Age: 45
End: 2025-08-09

## (undated) DEVICE — SPECIMEN TRAP VACUUM SUCTION SAFETOUCH 003853-902

## (undated) DEVICE — SOL NACL 0.9% IRRIG 1000ML BOTTLE 2F7124

## (undated) DEVICE — SUCTION VACUUM CANISTER STANDARD W/LID&CAPS 003987-901

## (undated) DEVICE — SOL WATER IRRIG 1000ML BOTTLE 2F7114

## (undated) DEVICE — COVER PROBE ULTRASOUND 3D W/GEL 5X96" LF 20-P3D596

## (undated) DEVICE — Device

## (undated) DEVICE — LINEN TOWEL PACK X5 5464

## (undated) DEVICE — PAD CHUX UNDERPAD 30X36" P3036C

## (undated) DEVICE — LINEN GOWN X4 5410

## (undated) DEVICE — GLOVE PROTEXIS W/NEU-THERA 6.5  2D73TE65

## (undated) RX ORDER — DOXYCYCLINE 100 MG/1
CAPSULE ORAL
Status: DISPENSED
Start: 2021-03-30

## (undated) RX ORDER — ACETAMINOPHEN 325 MG/1
TABLET ORAL
Status: DISPENSED
Start: 2021-03-30